# Patient Record
Sex: FEMALE | Race: ASIAN | Employment: UNEMPLOYED | URBAN - METROPOLITAN AREA
[De-identification: names, ages, dates, MRNs, and addresses within clinical notes are randomized per-mention and may not be internally consistent; named-entity substitution may affect disease eponyms.]

---

## 2022-07-09 ENCOUNTER — APPOINTMENT (OUTPATIENT)
Dept: GENERAL RADIOLOGY | Age: 47
End: 2022-07-09
Attending: EMERGENCY MEDICINE
Payer: COMMERCIAL

## 2022-07-09 ENCOUNTER — HOSPITAL ENCOUNTER (EMERGENCY)
Age: 47
Discharge: HOME | End: 2022-07-10
Attending: EMERGENCY MEDICINE
Payer: COMMERCIAL

## 2022-07-09 DIAGNOSIS — U07.1 COVID-19: Primary | ICD-10-CM

## 2022-07-09 DIAGNOSIS — R42 ORTHOSTATIC DIZZINESS: ICD-10-CM

## 2022-07-09 DIAGNOSIS — R42 VERTIGO: ICD-10-CM

## 2022-07-09 LAB
ALBUMIN SERPL-MCNC: 3.3 G/DL (ref 3.5–5)
ALBUMIN/GLOB SERPL: 0.7 {RATIO} (ref 1.1–2.2)
ALP SERPL-CCNC: 97 U/L (ref 45–117)
ALT SERPL-CCNC: 72 U/L (ref 12–78)
ANION GAP SERPL CALC-SCNC: 5 MMOL/L (ref 5–15)
AST SERPL-CCNC: 39 U/L (ref 15–37)
BASOPHILS # BLD: 0 K/UL (ref 0–0.1)
BASOPHILS NFR BLD: 0 % (ref 0–1)
BILIRUB SERPL-MCNC: 0.2 MG/DL (ref 0.2–1)
BUN SERPL-MCNC: 7 MG/DL (ref 6–20)
BUN/CREAT SERPL: 10 (ref 12–20)
CALCIUM SERPL-MCNC: 9 MG/DL (ref 8.5–10.1)
CHLORIDE SERPL-SCNC: 102 MMOL/L (ref 97–108)
CO2 SERPL-SCNC: 28 MMOL/L (ref 21–32)
CREAT SERPL-MCNC: 0.73 MG/DL (ref 0.55–1.02)
DIFFERENTIAL METHOD BLD: NORMAL
EOSINOPHIL # BLD: 0 K/UL (ref 0–0.4)
EOSINOPHIL NFR BLD: 1 % (ref 0–7)
ERYTHROCYTE [DISTWIDTH] IN BLOOD BY AUTOMATED COUNT: 13.2 % (ref 11.5–14.5)
GLOBULIN SER CALC-MCNC: 4.6 G/DL (ref 2–4)
GLUCOSE SERPL-MCNC: 171 MG/DL (ref 65–100)
HCT VFR BLD AUTO: 41.3 % (ref 35–47)
HGB BLD-MCNC: 13.7 G/DL (ref 11.5–16)
IMM GRANULOCYTES # BLD AUTO: 0 K/UL (ref 0–0.04)
IMM GRANULOCYTES NFR BLD AUTO: 0 % (ref 0–0.5)
LYMPHOCYTES # BLD: 1.9 K/UL (ref 0.8–3.5)
LYMPHOCYTES NFR BLD: 27 % (ref 12–49)
MCH RBC QN AUTO: 28.7 PG (ref 26–34)
MCHC RBC AUTO-ENTMCNC: 33.2 G/DL (ref 30–36.5)
MCV RBC AUTO: 86.4 FL (ref 80–99)
MONOCYTES # BLD: 0.9 K/UL (ref 0–1)
MONOCYTES NFR BLD: 13 % (ref 5–13)
NEUTS SEG # BLD: 4.1 K/UL (ref 1.8–8)
NEUTS SEG NFR BLD: 59 % (ref 32–75)
NRBC # BLD: 0 K/UL (ref 0–0.01)
NRBC BLD-RTO: 0 PER 100 WBC
PLATELET # BLD AUTO: 272 K/UL (ref 150–400)
PMV BLD AUTO: 11 FL (ref 8.9–12.9)
POTASSIUM SERPL-SCNC: 3.7 MMOL/L (ref 3.5–5.1)
PROT SERPL-MCNC: 7.9 G/DL (ref 6.4–8.2)
RBC # BLD AUTO: 4.78 M/UL (ref 3.8–5.2)
SODIUM SERPL-SCNC: 135 MMOL/L (ref 136–145)
WBC # BLD AUTO: 6.9 K/UL (ref 3.6–11)

## 2022-07-09 PROCEDURE — 96361 HYDRATE IV INFUSION ADD-ON: CPT

## 2022-07-09 PROCEDURE — 99285 EMERGENCY DEPT VISIT HI MDM: CPT

## 2022-07-09 PROCEDURE — 36415 COLL VENOUS BLD VENIPUNCTURE: CPT

## 2022-07-09 PROCEDURE — 74011250636 HC RX REV CODE- 250/636: Performed by: EMERGENCY MEDICINE

## 2022-07-09 PROCEDURE — 96374 THER/PROPH/DIAG INJ IV PUSH: CPT

## 2022-07-09 PROCEDURE — 96375 TX/PRO/DX INJ NEW DRUG ADDON: CPT

## 2022-07-09 PROCEDURE — 85025 COMPLETE CBC W/AUTO DIFF WBC: CPT

## 2022-07-09 PROCEDURE — 80053 COMPREHEN METABOLIC PANEL: CPT

## 2022-07-09 PROCEDURE — 71045 X-RAY EXAM CHEST 1 VIEW: CPT

## 2022-07-09 RX ORDER — ONDANSETRON 2 MG/ML
4 INJECTION INTRAMUSCULAR; INTRAVENOUS
Status: COMPLETED | OUTPATIENT
Start: 2022-07-09 | End: 2022-07-09

## 2022-07-09 RX ORDER — TENOFOVIR ALAFENAMIDE 25 MG/1
25 TABLET ORAL DAILY
COMMUNITY

## 2022-07-09 RX ORDER — SODIUM CHLORIDE 0.9 % (FLUSH) 0.9 %
5-40 SYRINGE (ML) INJECTION EVERY 8 HOURS
Status: DISCONTINUED | OUTPATIENT
Start: 2022-07-09 | End: 2022-07-10 | Stop reason: HOSPADM

## 2022-07-09 RX ORDER — DEXAMETHASONE SODIUM PHOSPHATE 4 MG/ML
10 INJECTION, SOLUTION INTRA-ARTICULAR; INTRALESIONAL; INTRAMUSCULAR; INTRAVENOUS; SOFT TISSUE
Status: COMPLETED | OUTPATIENT
Start: 2022-07-09 | End: 2022-07-09

## 2022-07-09 RX ORDER — SODIUM CHLORIDE 0.9 % (FLUSH) 0.9 %
5-40 SYRINGE (ML) INJECTION AS NEEDED
Status: DISCONTINUED | OUTPATIENT
Start: 2022-07-09 | End: 2022-07-10 | Stop reason: HOSPADM

## 2022-07-09 RX ORDER — MECLIZINE HCL 12.5 MG 12.5 MG/1
25 TABLET ORAL
Status: COMPLETED | OUTPATIENT
Start: 2022-07-09 | End: 2022-07-09

## 2022-07-09 RX ORDER — KETOROLAC TROMETHAMINE 30 MG/ML
30 INJECTION, SOLUTION INTRAMUSCULAR; INTRAVENOUS
Status: COMPLETED | OUTPATIENT
Start: 2022-07-09 | End: 2022-07-09

## 2022-07-09 RX ADMIN — DEXAMETHASONE SODIUM PHOSPHATE 10 MG: 4 INJECTION, SOLUTION INTRAMUSCULAR; INTRAVENOUS at 22:58

## 2022-07-09 RX ADMIN — KETOROLAC TROMETHAMINE 30 MG: 30 INJECTION, SOLUTION INTRAMUSCULAR at 22:59

## 2022-07-09 RX ADMIN — MECLIZINE 25 MG: 12.5 TABLET ORAL at 22:59

## 2022-07-09 RX ADMIN — SODIUM CHLORIDE 1000 ML: 9 INJECTION, SOLUTION INTRAVENOUS at 22:59

## 2022-07-09 RX ADMIN — ONDANSETRON 4 MG: 2 INJECTION INTRAMUSCULAR; INTRAVENOUS at 22:59

## 2022-07-10 ENCOUNTER — APPOINTMENT (OUTPATIENT)
Dept: CT IMAGING | Age: 47
End: 2022-07-10
Attending: EMERGENCY MEDICINE
Payer: COMMERCIAL

## 2022-07-10 VITALS
HEIGHT: 64 IN | WEIGHT: 180 LBS | DIASTOLIC BLOOD PRESSURE: 82 MMHG | BODY MASS INDEX: 30.73 KG/M2 | RESPIRATION RATE: 26 BRPM | TEMPERATURE: 97.8 F | OXYGEN SATURATION: 95 % | HEART RATE: 85 BPM | SYSTOLIC BLOOD PRESSURE: 131 MMHG

## 2022-07-10 PROCEDURE — 74011250636 HC RX REV CODE- 250/636: Performed by: EMERGENCY MEDICINE

## 2022-07-10 PROCEDURE — 70496 CT ANGIOGRAPHY HEAD: CPT

## 2022-07-10 PROCEDURE — 74011000636 HC RX REV CODE- 636: Performed by: EMERGENCY MEDICINE

## 2022-07-10 PROCEDURE — 70450 CT HEAD/BRAIN W/O DYE: CPT

## 2022-07-10 RX ORDER — NIRMATRELVIR AND RITONAVIR 300-100 MG
KIT ORAL
Qty: 1 BOX | Refills: 0 | Status: SHIPPED | OUTPATIENT
Start: 2022-07-10

## 2022-07-10 RX ORDER — ONDANSETRON 4 MG/1
4 TABLET, ORALLY DISINTEGRATING ORAL
Qty: 10 TABLET | Refills: 0 | Status: SHIPPED | OUTPATIENT
Start: 2022-07-10

## 2022-07-10 RX ORDER — DEXAMETHASONE 2 MG/1
TABLET ORAL
Qty: 24 TABLET | Refills: 0 | Status: SHIPPED | OUTPATIENT
Start: 2022-07-10

## 2022-07-10 RX ORDER — MECLIZINE HYDROCHLORIDE 25 MG/1
25 TABLET ORAL
Qty: 20 TABLET | Refills: 0 | Status: SHIPPED | OUTPATIENT
Start: 2022-07-10

## 2022-07-10 RX ADMIN — SODIUM CHLORIDE 1000 ML: 9 INJECTION, SOLUTION INTRAVENOUS at 00:33

## 2022-07-10 RX ADMIN — IOPAMIDOL 100 ML: 755 INJECTION, SOLUTION INTRAVENOUS at 01:11

## 2022-07-10 NOTE — ED NOTES
Post Fall Documentation      Irene NOEL. 7. witnessed fall occurred on 07/10/2022(Date) at [address] (Time). The answers to the following questions summarize the fall: In the patient's own words,:  · What were you attempting to do when you fell? Setting on side of bed waiting to ambulate w/ nurse   · Do you know why you fell? Dizziness   · Do you have any pain/discomfort or any other complaints? No complaints/pain/injury  · Which part of your body made contact with the floor or other object? Buttocks, right side or body     Nurse:  Cem Davis Was this an assisted fall? yes   Was fall witnessed? Yes     By Mehnaz If witnessed, what part of the body made contact with the floor or other object? Buttocks then laid on right side   Patients mental status after the fall/when found: Alert and oriented   Any apparent injury:  No apparent injury   Immediate interventions for injury/suspected injury? No interventions needed   Patient assisted back to bed? Assist X2   Name of provider notified and time, any comments? Dr. Derek Ahn Name of family member notified and time: family member at bedside at time of fall       Immediate VS and physical assessment documented in flow sheets. Neuro assessment every hour x 4 (for potential head injury or unwitnessed fall) documented in flow sheets.       Alex Monroy

## 2022-07-10 NOTE — ED PROVIDER NOTES
EMERGENCY DEPARTMENT HISTORY AND PHYSICAL EXAM      Date: 7/9/2022  Patient Name: Sharona Osborne    History of Presenting Illness     Chief Complaint   Patient presents with    Positive For Covid-19     pt presents with c/o covid + test at home, per family pt has had cough and nausea with dizziness and fever at home. Pt afebrile in triage, family states pt has been taking tylenola dn ibuprofen at home for symptoms. Pt 100% on room air with pmh of vertigo. History Provided By: Patient's Daughter    HPI: Sharona Osborne, 49 y.o. female presents to the ED with cc of fever, dizziness, nausea vomiting. History obtained at the bedside by daughter. Patient has been ill over the last 3 days with fever and chills. In addition to this patient has had dry cough. There is been nasal congestion. Today patient was having dizziness episodes consistent with her vertigo that she has had in the past along with nausea and vomiting. Dizziness is intermittent in nature room spinning associated with positional changes. Dizziness is moderate in severity with no alleviating factors. Patient's nausea vomiting has been accompanied with dizziness episodes. In addition she is also had nausea, however, she denies any abdominal pain or diarrhea. Per the family patient has had a normal gait. There has been no headache, loss of sensation or loss of strength. She denies any neck pain or stiffness. Patient has been medicated at home with Tylenol and ibuprofen. There are no other complaints, changes, or physical findings at this time. PCP: No primary care provider on file. No current facility-administered medications on file prior to encounter. No current outpatient medications on file prior to encounter. Past History     Past Medical History:  History reviewed. No pertinent past medical history. Past Surgical History:  History reviewed. No pertinent surgical history. Family History:  History reviewed.  No pertinent family history. Social History:  Social History     Tobacco Use    Smoking status: Never Smoker    Smokeless tobacco: Never Used   Substance Use Topics    Alcohol use: No    Drug use: No       Allergies:  No Known Allergies      Review of Systems   Review of Systems   Constitutional: Positive for chills, fatigue and fever. Negative for appetite change. HENT: Positive for congestion. Negative for rhinorrhea, sinus pressure and sore throat. Eyes: Negative. Respiratory: Positive for cough. Negative for choking, chest tightness, shortness of breath and wheezing. Cardiovascular: Negative. Negative for chest pain, palpitations and leg swelling. Gastrointestinal: Positive for nausea and vomiting. Negative for abdominal pain, constipation and diarrhea. Endocrine: Negative. Genitourinary: Negative. Negative for difficulty urinating, dysuria, flank pain and urgency. Musculoskeletal: Negative. Skin: Negative. Neurological: Positive for dizziness. Negative for speech difficulty, weakness, light-headedness, numbness and headaches. Psychiatric/Behavioral: Negative. All other systems reviewed and are negative. Physical Exam   Physical Exam  Vitals and nursing note reviewed. Constitutional:       General: She is not in acute distress. Appearance: She is well-developed. She is obese. She is not diaphoretic. Comments: Actively vomiting at the time of evaluation     HENT:      Head: Normocephalic and atraumatic. Mouth/Throat:      Pharynx: No oropharyngeal exudate. Eyes:      Conjunctiva/sclera: Conjunctivae normal.      Pupils: Pupils are equal, round, and reactive to light. Neck:      Vascular: No JVD. Trachea: No tracheal deviation. Cardiovascular:      Rate and Rhythm: Normal rate and regular rhythm. Heart sounds: Normal heart sounds. No murmur heard. Pulmonary:      Effort: Pulmonary effort is normal. No respiratory distress.       Breath sounds: Normal breath sounds. No stridor. No wheezing or rales. Abdominal:      General: There is no distension. Palpations: Abdomen is soft. Tenderness: There is no abdominal tenderness. There is no guarding or rebound. Musculoskeletal:         General: No tenderness. Normal range of motion. Cervical back: Normal range of motion and neck supple. Skin:     General: Skin is warm and dry. Capillary Refill: Capillary refill takes less than 2 seconds. Neurological:      Mental Status: She is alert and oriented to person, place, and time. Cranial Nerves: No cranial nerve deficit. Comments: No gross motor or sensory deficits, no resting nystagmus, normal gait     Psychiatric:         Behavior: Behavior normal.         Diagnostic Study Results     Labs -     Recent Results (from the past 12 hour(s))   CBC WITH AUTOMATED DIFF    Collection Time: 07/09/22 10:48 PM   Result Value Ref Range    WBC 6.9 3.6 - 11.0 K/uL    RBC 4.78 3.80 - 5.20 M/uL    HGB 13.7 11.5 - 16.0 g/dL    HCT 41.3 35.0 - 47.0 %    MCV 86.4 80.0 - 99.0 FL    MCH 28.7 26.0 - 34.0 PG    MCHC 33.2 30.0 - 36.5 g/dL    RDW 13.2 11.5 - 14.5 %    PLATELET 480 919 - 898 K/uL    MPV 11.0 8.9 - 12.9 FL    NRBC 0.0 0  WBC    ABSOLUTE NRBC 0.00 0.00 - 0.01 K/uL    NEUTROPHILS 59 32 - 75 %    LYMPHOCYTES 27 12 - 49 %    MONOCYTES 13 5 - 13 %    EOSINOPHILS 1 0 - 7 %    BASOPHILS 0 0 - 1 %    IMMATURE GRANULOCYTES 0 0.0 - 0.5 %    ABS. NEUTROPHILS 4.1 1.8 - 8.0 K/UL    ABS. LYMPHOCYTES 1.9 0.8 - 3.5 K/UL    ABS. MONOCYTES 0.9 0.0 - 1.0 K/UL    ABS. EOSINOPHILS 0.0 0.0 - 0.4 K/UL    ABS. BASOPHILS 0.0 0.0 - 0.1 K/UL    ABS. IMM.  GRANS. 0.0 0.00 - 0.04 K/UL    DF AUTOMATED     METABOLIC PANEL, COMPREHENSIVE    Collection Time: 07/09/22 10:48 PM   Result Value Ref Range    Sodium 135 (L) 136 - 145 mmol/L    Potassium 3.7 3.5 - 5.1 mmol/L    Chloride 102 97 - 108 mmol/L    CO2 28 21 - 32 mmol/L    Anion gap 5 5 - 15 mmol/L    Glucose 171 (H) 65 - 100 mg/dL    BUN 7 6 - 20 MG/DL    Creatinine 0.73 0.55 - 1.02 MG/DL    BUN/Creatinine ratio 10 (L) 12 - 20      GFR est AA >60 >60 ml/min/1.73m2    GFR est non-AA >60 >60 ml/min/1.73m2    Calcium 9.0 8.5 - 10.1 MG/DL    Bilirubin, total 0.2 0.2 - 1.0 MG/DL    ALT (SGPT) 72 12 - 78 U/L    AST (SGOT) 39 (H) 15 - 37 U/L    Alk. phosphatase 97 45 - 117 U/L    Protein, total 7.9 6.4 - 8.2 g/dL    Albumin 3.3 (L) 3.5 - 5.0 g/dL    Globulin 4.6 (H) 2.0 - 4.0 g/dL    A-G Ratio 0.7 (L) 1.1 - 2.2         Radiologic Studies -   CT HEAD WO CONT   Final Result   No acute intracranial abnormality. CTA HEAD NECK W CONT   Final Result      1. No acute process. No large vessel occlusion, arterial dissection, or   flow-limiting stenosis. 2. CT perfusion not performed. XR CHEST PORT   Final Result   Hypoinspiratory lungs. No focal airspace disease. CT Results  (Last 48 hours)    None        CXR Results  (Last 48 hours)    None          Medical Decision Making   I am the first provider for this patient. I reviewed the vital signs, available nursing notes, past medical history, past surgical history, family history and social history. Vital Signs-Reviewed the patient's vital signs. Patient Vitals for the past 12 hrs:   Temp Pulse Resp BP SpO2   07/10/22 0248 -- 85 26 131/82 95 %   07/10/22 0233 -- 87 28 129/79 95 %   07/10/22 0218 -- 88 26 123/78 95 %   07/10/22 0203 -- 88 26 127/78 96 %   07/10/22 0148 -- 88 25 131/83 96 %   07/10/22 0048 -- 83 23 124/84 95 %   07/10/22 0033 97.8 °F (36.6 °C) 80 26 137/84 98 %   07/09/22 2342 -- -- -- 115/76 94 %   07/09/22 2327 -- -- -- 127/77 97 %   07/09/22 2257 -- -- -- 128/83 94 %   07/09/22 2227 98.1 °F (36.7 °C) (!) 102 20 130/83 99 %       Records Reviewed: Nursing Notes    Provider Notes (Medical Decision Making):   DDx- COVID, Vertigo, pneumonia, dehydration    ED Course:   Initial assessment performed.  The patients presenting problems have been discussed, and they are in agreement with the care plan formulated and outlined with them. I have encouraged them to ask questions as they arise throughout their visit. Will provide IV fluids, Zofran, Toradol, Decadron, Meclizine for symptoms control and reassess. After 1st liter of fluids was going to have nursing get the pt up and ambulate. As they were getting ready to help her up. Pt had attempted to get up on her own became lightheaded and fell onto floor. Pt assisted up with no injuries or complaints. Additional liter of IV fluids ordered in addition CT head and CTA of head and neck. CT's unremarkable, no sig vascular disease. Pt feeling better after IV fluids. Pt able to ambulate unassisted without any lightheadedness. Likely syncope and dizziness related to orthostatic hypotension due to poor PO intake with COVID. Will dc home. Pt daughter bedside throughout and agrees with tx plan. Disposition:  DC home     DISCHARGE PLAN:  1. Discharge Medication List as of 7/10/2022  3:21 AM      START taking these medications    Details   nirmatrelvir-ritonavir (Paxlovid, EUA,) 150 mg x 2- 100 mg tablet Take as directed, Print, Disp-1 Box, R-0      ondansetron (ZOFRAN ODT) 4 mg disintegrating tablet Take 1 Tablet by mouth every eight (8) hours as needed for Nausea or Vomiting., Normal, Disp-10 Tablet, R-0      meclizine (ANTIVERT) 25 mg tablet Take 1 Tablet by mouth three (3) times daily as needed for Dizziness., Normal, Disp-20 Tablet, R-0      dexAMETHasone (DECADRON) 2 mg tablet Take 10mg on day 1, then 8mg on day 2/3, then 6mg on day 4/5, then 4mg on day 6/7, then 2mg x 1, Normal, Disp-24 Tablet, R-0         CONTINUE these medications which have NOT CHANGED    Details   tenofovir ALAFENAMIDE FUMARATE (Vemlidy) 25 mg tablet Take 25 mg by mouth daily. , Historical Med           2. Follow-up Information    None       3. Return to ED if worse     Diagnosis     Clinical Impression:   1. COVID-19    2. Vertigo    3. Orthostatic dizziness        Attestations:    Julieth Bernal, DO        Please note that this dictation was completed with [employer], the computer voice recognition software. Quite often unanticipated grammatical, syntax, homophones, and other interpretive errors are inadvertently transcribed by the computer software. Please disregard these errors. Please excuse any errors that have escaped final proofreading. Thank you.

## 2022-07-10 NOTE — ED NOTES
0030: This RN went into pt's room to try and ambulate her per MD request. This RN sat pt up slowly and pt was sitting steadily on side of bed with shoes on and feet flat on the floor. This RN began to put gloves on and pt slid off of bed and onto floor while family at bedside was holding onto pt. This RN got assistance from other staff to get pt back up and into bed and placed back on monitor. MD [facility] at bedside. Pt stated getting \"dizzy\" while sitting on the bed. Vital signs taken and documented and pt denies any injury. At this time, pt's family member told this RN that pt fell earlier today and hit her head. The pt's family member had not disclosed this information to any staff members prior to. MD [facility] notified and orders placed. 0300: Per MD [facility], attempt to ambulate pt again. X2 ED Techs and RN ambulated pt around room successfully and pt did no become increasingly dizzy. MD [facility] notified.

## 2022-07-10 NOTE — ED NOTES
Patient discharged by Rosalva Granado MD - pt sent to the front lobby in wheelchair then to private vehicle, no acute distress noted at time of discharge - Discharge information / home RX / and reasons to return to the ED were reviewed by the ED provider.

## 2022-07-10 NOTE — ED TRIAGE NOTES
.  Chief Complaint   Patient presents with    Positive For Covid-19     pt presents with c/o covid + test at home, per family pt has had cough and nausea with dizziness and fever at home. Pt afebrile in triage, family states pt has been taking tylenola dn ibuprofen at home for symptoms. Pt 100% on room air with pmh of vertigo.

## 2022-07-10 NOTE — DISCHARGE INSTRUCTIONS
Schedule meclizine every 8 hours for the next two days    Push fluid intake,     Alternate tylenol and ibuprofen every 3 hours for 3 days

## 2022-07-17 ENCOUNTER — HOSPITAL ENCOUNTER (INPATIENT)
Age: 47
LOS: 5 days | Discharge: HOME | DRG: 329 | End: 2022-07-22
Attending: STUDENT IN AN ORGANIZED HEALTH CARE EDUCATION/TRAINING PROGRAM | Admitting: SURGERY
Payer: COMMERCIAL

## 2022-07-17 ENCOUNTER — APPOINTMENT (OUTPATIENT)
Dept: GENERAL RADIOLOGY | Age: 47
DRG: 329 | End: 2022-07-17
Attending: STUDENT IN AN ORGANIZED HEALTH CARE EDUCATION/TRAINING PROGRAM
Payer: COMMERCIAL

## 2022-07-17 ENCOUNTER — APPOINTMENT (OUTPATIENT)
Dept: CT IMAGING | Age: 47
DRG: 329 | End: 2022-07-17
Attending: STUDENT IN AN ORGANIZED HEALTH CARE EDUCATION/TRAINING PROGRAM
Payer: COMMERCIAL

## 2022-07-17 ENCOUNTER — ANESTHESIA EVENT (OUTPATIENT)
Dept: SURGERY | Age: 47
DRG: 329 | End: 2022-07-17
Payer: COMMERCIAL

## 2022-07-17 ENCOUNTER — ANESTHESIA (OUTPATIENT)
Dept: SURGERY | Age: 47
DRG: 329 | End: 2022-07-17
Payer: COMMERCIAL

## 2022-07-17 DIAGNOSIS — K66.8 PNEUMOPERITONEUM: ICD-10-CM

## 2022-07-17 DIAGNOSIS — R19.8 PERFORATED VISCUS: Primary | ICD-10-CM

## 2022-07-17 LAB
ALBUMIN SERPL-MCNC: 2.7 G/DL (ref 3.5–5)
ALBUMIN/GLOB SERPL: 0.7 {RATIO} (ref 1.1–2.2)
ALP SERPL-CCNC: 73 U/L (ref 45–117)
ALT SERPL-CCNC: 54 U/L (ref 12–78)
ANION GAP SERPL CALC-SCNC: 8 MMOL/L (ref 5–15)
APPEARANCE UR: CLEAR
AST SERPL-CCNC: 19 U/L (ref 15–37)
BACTERIA URNS QL MICRO: NEGATIVE /HPF
BASOPHILS # BLD: 0 K/UL (ref 0–0.1)
BASOPHILS NFR BLD: 0 % (ref 0–1)
BILIRUB SERPL-MCNC: 0.5 MG/DL (ref 0.2–1)
BILIRUB UR QL: NEGATIVE
BNP SERPL-MCNC: 38 PG/ML
BUN SERPL-MCNC: 15 MG/DL (ref 6–20)
BUN/CREAT SERPL: 19 (ref 12–20)
CALCIUM SERPL-MCNC: 7.9 MG/DL (ref 8.5–10.1)
CHLORIDE SERPL-SCNC: 95 MMOL/L (ref 97–108)
CO2 SERPL-SCNC: 26 MMOL/L (ref 21–32)
COLOR UR: ABNORMAL
CREAT SERPL-MCNC: 0.78 MG/DL (ref 0.55–1.02)
D DIMER PPP FEU-MCNC: 0.54 MG/L FEU (ref 0–0.65)
DIFFERENTIAL METHOD BLD: ABNORMAL
EOSINOPHIL # BLD: 0 K/UL (ref 0–0.4)
EOSINOPHIL NFR BLD: 0 % (ref 0–7)
EPITH CASTS URNS QL MICRO: ABNORMAL /LPF
ERYTHROCYTE [DISTWIDTH] IN BLOOD BY AUTOMATED COUNT: 12.7 % (ref 11.5–14.5)
GLOBULIN SER CALC-MCNC: 4 G/DL (ref 2–4)
GLUCOSE SERPL-MCNC: 294 MG/DL (ref 65–100)
GLUCOSE UR STRIP.AUTO-MCNC: 250 MG/DL
HCG UR QL: NEGATIVE
HCT VFR BLD AUTO: 40.1 % (ref 35–47)
HGB BLD-MCNC: 13.7 G/DL (ref 11.5–16)
HGB UR QL STRIP: NEGATIVE
HYALINE CASTS URNS QL MICRO: ABNORMAL /LPF (ref 0–2)
IMM GRANULOCYTES # BLD AUTO: 0 K/UL (ref 0–0.04)
IMM GRANULOCYTES NFR BLD AUTO: 0 % (ref 0–0.5)
KETONES UR QL STRIP.AUTO: NEGATIVE MG/DL
LEUKOCYTE ESTERASE UR QL STRIP.AUTO: NEGATIVE
LYMPHOCYTES # BLD: 1.4 K/UL (ref 0.8–3.5)
LYMPHOCYTES NFR BLD: 5 % (ref 12–49)
MCH RBC QN AUTO: 29 PG (ref 26–34)
MCHC RBC AUTO-ENTMCNC: 34.2 G/DL (ref 30–36.5)
MCV RBC AUTO: 85 FL (ref 80–99)
MONOCYTES # BLD: 2.5 K/UL (ref 0–1)
MONOCYTES NFR BLD: 9 % (ref 5–13)
NEUTS SEG # BLD: 23.5 K/UL (ref 1.8–8)
NEUTS SEG NFR BLD: 86 % (ref 32–75)
NITRITE UR QL STRIP.AUTO: NEGATIVE
NRBC # BLD: 0 K/UL (ref 0–0.01)
NRBC BLD-RTO: 0 PER 100 WBC
PH UR STRIP: 5.5 [PH] (ref 5–8)
PLATELET # BLD AUTO: 458 K/UL (ref 150–400)
PMV BLD AUTO: 9.9 FL (ref 8.9–12.9)
POTASSIUM SERPL-SCNC: 4.1 MMOL/L (ref 3.5–5.1)
PROT SERPL-MCNC: 6.7 G/DL (ref 6.4–8.2)
PROT UR STRIP-MCNC: NEGATIVE MG/DL
RBC # BLD AUTO: 4.72 M/UL (ref 3.8–5.2)
RBC #/AREA URNS HPF: ABNORMAL /HPF (ref 0–5)
RBC MORPH BLD: ABNORMAL
SODIUM SERPL-SCNC: 129 MMOL/L (ref 136–145)
SP GR UR REFRACTOMETRY: <1.005 (ref 1–1.03)
UA: UC IF INDICATED,UAUC: ABNORMAL
UROBILINOGEN UR QL STRIP.AUTO: 1 EU/DL (ref 0.2–1)
WBC # BLD AUTO: 27.4 K/UL (ref 3.6–11)
WBC URNS QL MICRO: ABNORMAL /HPF (ref 0–4)

## 2022-07-17 PROCEDURE — 2709999900 HC NON-CHARGEABLE SUPPLY: Performed by: SURGERY

## 2022-07-17 PROCEDURE — 81001 URINALYSIS AUTO W/SCOPE: CPT

## 2022-07-17 PROCEDURE — 81025 URINE PREGNANCY TEST: CPT

## 2022-07-17 PROCEDURE — 77030008606 HC TRCR ENDOSC KII AMR -B: Performed by: SURGERY

## 2022-07-17 PROCEDURE — 76060000034 HC ANESTHESIA 1.5 TO 2 HR: Performed by: SURGERY

## 2022-07-17 PROCEDURE — 74011000250 HC RX REV CODE- 250: Performed by: STUDENT IN AN ORGANIZED HEALTH CARE EDUCATION/TRAINING PROGRAM

## 2022-07-17 PROCEDURE — 74011000258 HC RX REV CODE- 258: Performed by: STUDENT IN AN ORGANIZED HEALTH CARE EDUCATION/TRAINING PROGRAM

## 2022-07-17 PROCEDURE — 80053 COMPREHEN METABOLIC PANEL: CPT

## 2022-07-17 PROCEDURE — C9113 INJ PANTOPRAZOLE SODIUM, VIA: HCPCS | Performed by: SURGERY

## 2022-07-17 PROCEDURE — 74011250636 HC RX REV CODE- 250/636: Performed by: ANESTHESIOLOGY

## 2022-07-17 PROCEDURE — 74177 CT ABD & PELVIS W/CONTRAST: CPT

## 2022-07-17 PROCEDURE — 77030002916 HC SUT ETHLN J&J -A: Performed by: SURGERY

## 2022-07-17 PROCEDURE — 77030008684 HC TU ET CUF COVD -B: Performed by: ANESTHESIOLOGY

## 2022-07-17 PROCEDURE — 74011000250 HC RX REV CODE- 250: Performed by: SURGERY

## 2022-07-17 PROCEDURE — 77030005513 HC CATH URETH FOL11 MDII -B: Performed by: SURGERY

## 2022-07-17 PROCEDURE — 74011000258 HC RX REV CODE- 258: Performed by: SURGERY

## 2022-07-17 PROCEDURE — 71045 X-RAY EXAM CHEST 1 VIEW: CPT

## 2022-07-17 PROCEDURE — 77030002933 HC SUT MCRYL J&J -A: Performed by: SURGERY

## 2022-07-17 PROCEDURE — 77030020747 HC TU INSUF ENDOSC TELE -A: Performed by: SURGERY

## 2022-07-17 PROCEDURE — 96375 TX/PRO/DX INJ NEW DRUG ADDON: CPT

## 2022-07-17 PROCEDURE — 87040 BLOOD CULTURE FOR BACTERIA: CPT

## 2022-07-17 PROCEDURE — 74011000636 HC RX REV CODE- 636: Performed by: STUDENT IN AN ORGANIZED HEALTH CARE EDUCATION/TRAINING PROGRAM

## 2022-07-17 PROCEDURE — 74011000258 HC RX REV CODE- 258: Performed by: NURSE ANESTHETIST, CERTIFIED REGISTERED

## 2022-07-17 PROCEDURE — 36415 COLL VENOUS BLD VENIPUNCTURE: CPT

## 2022-07-17 PROCEDURE — 77030013567 HC DRN WND RESERV BARD -A: Performed by: SURGERY

## 2022-07-17 PROCEDURE — 77030012407 HC DRN WND BARD -B: Performed by: SURGERY

## 2022-07-17 PROCEDURE — 87205 SMEAR GRAM STAIN: CPT

## 2022-07-17 PROCEDURE — 87075 CULTR BACTERIA EXCEPT BLOOD: CPT

## 2022-07-17 PROCEDURE — 96374 THER/PROPH/DIAG INJ IV PUSH: CPT

## 2022-07-17 PROCEDURE — 99285 EMERGENCY DEPT VISIT HI MDM: CPT

## 2022-07-17 PROCEDURE — 74011250636 HC RX REV CODE- 250/636: Performed by: NURSE ANESTHETIST, CERTIFIED REGISTERED

## 2022-07-17 PROCEDURE — 77030013079 HC BLNKT BAIR HGGR 3M -A: Performed by: ANESTHESIOLOGY

## 2022-07-17 PROCEDURE — 77030021678 HC GLIDESCP STAT DISP VERT -B: Performed by: ANESTHESIOLOGY

## 2022-07-17 PROCEDURE — 74011250636 HC RX REV CODE- 250/636: Performed by: SURGERY

## 2022-07-17 PROCEDURE — 76210000006 HC OR PH I REC 0.5 TO 1 HR: Performed by: SURGERY

## 2022-07-17 PROCEDURE — 77030033639 HC SHR ENDO COAG HARM 36 J&J -E: Performed by: SURGERY

## 2022-07-17 PROCEDURE — 77030019908 HC STETH ESOPH SIMS -A: Performed by: ANESTHESIOLOGY

## 2022-07-17 PROCEDURE — 77030016151 HC PROTCTR LNS DFOG COVD -B: Performed by: SURGERY

## 2022-07-17 PROCEDURE — 77030026438 HC STYL ET INTUB CARD -A: Performed by: ANESTHESIOLOGY

## 2022-07-17 PROCEDURE — 74011250636 HC RX REV CODE- 250/636: Performed by: STUDENT IN AN ORGANIZED HEALTH CARE EDUCATION/TRAINING PROGRAM

## 2022-07-17 PROCEDURE — 77030008771 HC TU NG SALEM SUMP -A: Performed by: ANESTHESIOLOGY

## 2022-07-17 PROCEDURE — 65270000029 HC RM PRIVATE

## 2022-07-17 PROCEDURE — 74011000250 HC RX REV CODE- 250: Performed by: NURSE ANESTHETIST, CERTIFIED REGISTERED

## 2022-07-17 PROCEDURE — 77030002996 HC SUT SLK J&J -A: Performed by: SURGERY

## 2022-07-17 PROCEDURE — 77030008595 HC TRCR ENDOSC AMR -A: Performed by: SURGERY

## 2022-07-17 PROCEDURE — 76010000153 HC OR TIME 1.5 TO 2 HR: Performed by: SURGERY

## 2022-07-17 PROCEDURE — 93005 ELECTROCARDIOGRAM TRACING: CPT

## 2022-07-17 PROCEDURE — 77030008596 HC TRCR ENDOSC AMR -B: Performed by: SURGERY

## 2022-07-17 PROCEDURE — 99221 1ST HOSP IP/OBS SF/LOW 40: CPT | Performed by: SURGERY

## 2022-07-17 PROCEDURE — 83880 ASSAY OF NATRIURETIC PEPTIDE: CPT

## 2022-07-17 PROCEDURE — 77030008756 HC TU IRR SUC STRY -B: Performed by: SURGERY

## 2022-07-17 PROCEDURE — 85379 FIBRIN DEGRADATION QUANT: CPT

## 2022-07-17 PROCEDURE — 85025 COMPLETE CBC W/AUTO DIFF WBC: CPT

## 2022-07-17 RX ORDER — CEFAZOLIN SODIUM/WATER 2 G/20 ML
SYRINGE (ML) INTRAVENOUS AS NEEDED
Status: DISCONTINUED | OUTPATIENT
Start: 2022-07-17 | End: 2022-07-17 | Stop reason: HOSPADM

## 2022-07-17 RX ORDER — HYDROMORPHONE HYDROCHLORIDE 1 MG/ML
0.5 INJECTION, SOLUTION INTRAMUSCULAR; INTRAVENOUS; SUBCUTANEOUS
Status: DISCONTINUED | OUTPATIENT
Start: 2022-07-17 | End: 2022-07-22 | Stop reason: HOSPADM

## 2022-07-17 RX ORDER — HYDROMORPHONE HYDROCHLORIDE 1 MG/ML
INJECTION, SOLUTION INTRAMUSCULAR; INTRAVENOUS; SUBCUTANEOUS
Status: DISCONTINUED
Start: 2022-07-17 | End: 2022-07-17 | Stop reason: WASHOUT

## 2022-07-17 RX ORDER — MIDAZOLAM HYDROCHLORIDE 1 MG/ML
INJECTION, SOLUTION INTRAMUSCULAR; INTRAVENOUS AS NEEDED
Status: DISCONTINUED | OUTPATIENT
Start: 2022-07-17 | End: 2022-07-17 | Stop reason: HOSPADM

## 2022-07-17 RX ORDER — ONDANSETRON 2 MG/ML
INJECTION INTRAMUSCULAR; INTRAVENOUS
Status: DISCONTINUED
Start: 2022-07-17 | End: 2022-07-17 | Stop reason: WASHOUT

## 2022-07-17 RX ORDER — SODIUM CHLORIDE, SODIUM LACTATE, POTASSIUM CHLORIDE, CALCIUM CHLORIDE 600; 310; 30; 20 MG/100ML; MG/100ML; MG/100ML; MG/100ML
INJECTION, SOLUTION INTRAVENOUS
Status: DISCONTINUED | OUTPATIENT
Start: 2022-07-17 | End: 2022-07-17 | Stop reason: HOSPADM

## 2022-07-17 RX ORDER — HYDROMORPHONE HYDROCHLORIDE 1 MG/ML
0.2 INJECTION, SOLUTION INTRAMUSCULAR; INTRAVENOUS; SUBCUTANEOUS
Status: DISCONTINUED | OUTPATIENT
Start: 2022-07-17 | End: 2022-07-17 | Stop reason: HOSPADM

## 2022-07-17 RX ORDER — DIPHENHYDRAMINE HYDROCHLORIDE 50 MG/ML
25 INJECTION, SOLUTION INTRAMUSCULAR; INTRAVENOUS
Status: DISCONTINUED | OUTPATIENT
Start: 2022-07-17 | End: 2022-07-22 | Stop reason: HOSPADM

## 2022-07-17 RX ORDER — MECLIZINE HCL 12.5 MG 12.5 MG/1
25 TABLET ORAL
Status: DISCONTINUED | OUTPATIENT
Start: 2022-07-17 | End: 2022-07-22 | Stop reason: HOSPADM

## 2022-07-17 RX ORDER — ONDANSETRON 2 MG/ML
4 INJECTION INTRAMUSCULAR; INTRAVENOUS
Status: DISCONTINUED | OUTPATIENT
Start: 2022-07-17 | End: 2022-07-22 | Stop reason: HOSPADM

## 2022-07-17 RX ORDER — KETOROLAC TROMETHAMINE 30 MG/ML
15 INJECTION, SOLUTION INTRAMUSCULAR; INTRAVENOUS
Status: COMPLETED | OUTPATIENT
Start: 2022-07-17 | End: 2022-07-17

## 2022-07-17 RX ORDER — SODIUM CHLORIDE 0.9 % (FLUSH) 0.9 %
5-40 SYRINGE (ML) INJECTION AS NEEDED
Status: CANCELLED | OUTPATIENT
Start: 2022-07-17

## 2022-07-17 RX ORDER — ROCURONIUM BROMIDE 10 MG/ML
INJECTION, SOLUTION INTRAVENOUS AS NEEDED
Status: DISCONTINUED | OUTPATIENT
Start: 2022-07-17 | End: 2022-07-17 | Stop reason: HOSPADM

## 2022-07-17 RX ORDER — SODIUM CHLORIDE 0.9 % (FLUSH) 0.9 %
5-40 SYRINGE (ML) INJECTION EVERY 8 HOURS
Status: DISCONTINUED | OUTPATIENT
Start: 2022-07-17 | End: 2022-07-17 | Stop reason: HOSPADM

## 2022-07-17 RX ORDER — SODIUM CHLORIDE 0.9 % (FLUSH) 0.9 %
5-40 SYRINGE (ML) INJECTION EVERY 8 HOURS
Status: CANCELLED | OUTPATIENT
Start: 2022-07-17

## 2022-07-17 RX ORDER — FENTANYL CITRATE 50 UG/ML
INJECTION, SOLUTION INTRAMUSCULAR; INTRAVENOUS AS NEEDED
Status: DISCONTINUED | OUTPATIENT
Start: 2022-07-17 | End: 2022-07-17 | Stop reason: HOSPADM

## 2022-07-17 RX ORDER — ONDANSETRON 2 MG/ML
INJECTION INTRAMUSCULAR; INTRAVENOUS AS NEEDED
Status: DISCONTINUED | OUTPATIENT
Start: 2022-07-17 | End: 2022-07-17 | Stop reason: HOSPADM

## 2022-07-17 RX ORDER — SODIUM CHLORIDE 0.9 % (FLUSH) 0.9 %
5-40 SYRINGE (ML) INJECTION EVERY 8 HOURS
Status: DISCONTINUED | OUTPATIENT
Start: 2022-07-17 | End: 2022-07-22 | Stop reason: HOSPADM

## 2022-07-17 RX ORDER — FENTANYL CITRATE 50 UG/ML
25 INJECTION, SOLUTION INTRAMUSCULAR; INTRAVENOUS
Status: DISCONTINUED | OUTPATIENT
Start: 2022-07-17 | End: 2022-07-17 | Stop reason: HOSPADM

## 2022-07-17 RX ORDER — FENTANYL CITRATE 50 UG/ML
50 INJECTION, SOLUTION INTRAMUSCULAR; INTRAVENOUS
Status: COMPLETED | OUTPATIENT
Start: 2022-07-17 | End: 2022-07-17

## 2022-07-17 RX ORDER — SODIUM CHLORIDE 0.9 % (FLUSH) 0.9 %
5-40 SYRINGE (ML) INJECTION AS NEEDED
Status: DISCONTINUED | OUTPATIENT
Start: 2022-07-17 | End: 2022-07-22 | Stop reason: HOSPADM

## 2022-07-17 RX ORDER — DEXTROSE, SODIUM CHLORIDE, AND POTASSIUM CHLORIDE 5; .45; .15 G/100ML; G/100ML; G/100ML
125 INJECTION INTRAVENOUS CONTINUOUS
Status: DISCONTINUED | OUTPATIENT
Start: 2022-07-17 | End: 2022-07-18

## 2022-07-17 RX ORDER — SUCCINYLCHOLINE CHLORIDE 20 MG/ML
INJECTION INTRAMUSCULAR; INTRAVENOUS AS NEEDED
Status: DISCONTINUED | OUTPATIENT
Start: 2022-07-17 | End: 2022-07-17 | Stop reason: HOSPADM

## 2022-07-17 RX ORDER — SODIUM CHLORIDE 0.9 % (FLUSH) 0.9 %
5-40 SYRINGE (ML) INJECTION AS NEEDED
Status: DISCONTINUED | OUTPATIENT
Start: 2022-07-17 | End: 2022-07-17 | Stop reason: HOSPADM

## 2022-07-17 RX ORDER — DEXAMETHASONE SODIUM PHOSPHATE 4 MG/ML
INJECTION, SOLUTION INTRA-ARTICULAR; INTRALESIONAL; INTRAMUSCULAR; INTRAVENOUS; SOFT TISSUE AS NEEDED
Status: DISCONTINUED | OUTPATIENT
Start: 2022-07-17 | End: 2022-07-17 | Stop reason: HOSPADM

## 2022-07-17 RX ORDER — BUPIVACAINE HYDROCHLORIDE AND EPINEPHRINE 5; 5 MG/ML; UG/ML
INJECTION, SOLUTION EPIDURAL; INTRACAUDAL; PERINEURAL AS NEEDED
Status: DISCONTINUED | OUTPATIENT
Start: 2022-07-17 | End: 2022-07-17 | Stop reason: HOSPADM

## 2022-07-17 RX ORDER — ONDANSETRON 2 MG/ML
4 INJECTION INTRAMUSCULAR; INTRAVENOUS AS NEEDED
Status: DISCONTINUED | OUTPATIENT
Start: 2022-07-17 | End: 2022-07-17 | Stop reason: HOSPADM

## 2022-07-17 RX ORDER — PROPOFOL 10 MG/ML
INJECTION, EMULSION INTRAVENOUS AS NEEDED
Status: DISCONTINUED | OUTPATIENT
Start: 2022-07-17 | End: 2022-07-17 | Stop reason: HOSPADM

## 2022-07-17 RX ORDER — HYDROMORPHONE HYDROCHLORIDE 2 MG/ML
INJECTION, SOLUTION INTRAMUSCULAR; INTRAVENOUS; SUBCUTANEOUS AS NEEDED
Status: DISCONTINUED | OUTPATIENT
Start: 2022-07-17 | End: 2022-07-17 | Stop reason: HOSPADM

## 2022-07-17 RX ORDER — LIDOCAINE HYDROCHLORIDE 20 MG/ML
INJECTION, SOLUTION EPIDURAL; INFILTRATION; INTRACAUDAL; PERINEURAL AS NEEDED
Status: DISCONTINUED | OUTPATIENT
Start: 2022-07-17 | End: 2022-07-17 | Stop reason: HOSPADM

## 2022-07-17 RX ORDER — NALOXONE HYDROCHLORIDE 0.4 MG/ML
0.4 INJECTION, SOLUTION INTRAMUSCULAR; INTRAVENOUS; SUBCUTANEOUS AS NEEDED
Status: DISCONTINUED | OUTPATIENT
Start: 2022-07-17 | End: 2022-07-22 | Stop reason: HOSPADM

## 2022-07-17 RX ORDER — ACETAMINOPHEN 325 MG/1
650 TABLET ORAL
Status: DISCONTINUED | OUTPATIENT
Start: 2022-07-17 | End: 2022-07-22 | Stop reason: HOSPADM

## 2022-07-17 RX ADMIN — HYDROMORPHONE HYDROCHLORIDE 0.2 MG: 2 INJECTION, SOLUTION INTRAMUSCULAR; INTRAVENOUS; SUBCUTANEOUS at 16:57

## 2022-07-17 RX ADMIN — FENTANYL CITRATE 50 MCG: 50 INJECTION, SOLUTION INTRAMUSCULAR; INTRAVENOUS at 13:09

## 2022-07-17 RX ADMIN — ONDANSETRON HYDROCHLORIDE 4 MG: 2 INJECTION, SOLUTION INTRAMUSCULAR; INTRAVENOUS at 16:16

## 2022-07-17 RX ADMIN — FENTANYL CITRATE 50 MCG: 50 INJECTION, SOLUTION INTRAMUSCULAR; INTRAVENOUS at 16:03

## 2022-07-17 RX ADMIN — FENTANYL CITRATE 25 MCG: 50 INJECTION, SOLUTION INTRAMUSCULAR; INTRAVENOUS at 18:05

## 2022-07-17 RX ADMIN — DEXAMETHASONE SODIUM PHOSPHATE 8 MG: 4 INJECTION, SOLUTION INTRAMUSCULAR; INTRAVENOUS at 16:16

## 2022-07-17 RX ADMIN — HYDROMORPHONE HYDROCHLORIDE 0.2 MG: 2 INJECTION, SOLUTION INTRAMUSCULAR; INTRAVENOUS; SUBCUTANEOUS at 16:37

## 2022-07-17 RX ADMIN — FENTANYL CITRATE 50 MCG: 50 INJECTION, SOLUTION INTRAMUSCULAR; INTRAVENOUS at 16:28

## 2022-07-17 RX ADMIN — PIPERACILLIN AND TAZOBACTAM 3.38 G: 3; .375 INJECTION, POWDER, FOR SOLUTION INTRAVENOUS at 13:10

## 2022-07-17 RX ADMIN — HYDROMORPHONE HYDROCHLORIDE 0.5 MG: 1 INJECTION, SOLUTION INTRAMUSCULAR; INTRAVENOUS; SUBCUTANEOUS at 22:08

## 2022-07-17 RX ADMIN — DEXMEDETOMIDINE HYDROCHLORIDE 10 MCG: 100 INJECTION, SOLUTION, CONCENTRATE INTRAVENOUS at 16:13

## 2022-07-17 RX ADMIN — ROCURONIUM BROMIDE 40 MG: 10 INJECTION INTRAVENOUS at 16:28

## 2022-07-17 RX ADMIN — FENTANYL CITRATE 25 MCG: 50 INJECTION, SOLUTION INTRAMUSCULAR; INTRAVENOUS at 17:55

## 2022-07-17 RX ADMIN — HYDROMORPHONE HYDROCHLORIDE 0.2 MG: 2 INJECTION, SOLUTION INTRAMUSCULAR; INTRAVENOUS; SUBCUTANEOUS at 16:47

## 2022-07-17 RX ADMIN — ROCURONIUM BROMIDE 10 MG: 10 INJECTION INTRAVENOUS at 16:59

## 2022-07-17 RX ADMIN — HYDROMORPHONE HYDROCHLORIDE 0.2 MG: 2 INJECTION, SOLUTION INTRAMUSCULAR; INTRAVENOUS; SUBCUTANEOUS at 16:34

## 2022-07-17 RX ADMIN — SODIUM CHLORIDE, POTASSIUM CHLORIDE, SODIUM LACTATE AND CALCIUM CHLORIDE: 600; 310; 30; 20 INJECTION, SOLUTION INTRAVENOUS at 16:06

## 2022-07-17 RX ADMIN — MIDAZOLAM HYDROCHLORIDE 2 MG: 1 INJECTION, SOLUTION INTRAMUSCULAR; INTRAVENOUS at 15:58

## 2022-07-17 RX ADMIN — ROCURONIUM BROMIDE 5 MG: 10 INJECTION INTRAVENOUS at 16:03

## 2022-07-17 RX ADMIN — FAMOTIDINE 20 MG: 10 INJECTION, SOLUTION INTRAVENOUS at 11:46

## 2022-07-17 RX ADMIN — PROPOFOL 150 MG: 10 INJECTION, EMULSION INTRAVENOUS at 16:03

## 2022-07-17 RX ADMIN — SODIUM CHLORIDE, PRESERVATIVE FREE 40 MG: 5 INJECTION INTRAVENOUS at 21:52

## 2022-07-17 RX ADMIN — Medication 2 G: at 16:13

## 2022-07-17 RX ADMIN — IOPAMIDOL 100 ML: 755 INJECTION, SOLUTION INTRAVENOUS at 12:20

## 2022-07-17 RX ADMIN — KETOROLAC TROMETHAMINE 15 MG: 30 INJECTION, SOLUTION INTRAMUSCULAR at 11:46

## 2022-07-17 RX ADMIN — DEXMEDETOMIDINE HYDROCHLORIDE 10 MCG: 100 INJECTION, SOLUTION, CONCENTRATE INTRAVENOUS at 16:24

## 2022-07-17 RX ADMIN — POTASSIUM CHLORIDE, DEXTROSE MONOHYDRATE AND SODIUM CHLORIDE 125 ML/HR: 150; 5; 450 INJECTION, SOLUTION INTRAVENOUS at 21:48

## 2022-07-17 RX ADMIN — SODIUM CHLORIDE, PRESERVATIVE FREE 10 ML: 5 INJECTION INTRAVENOUS at 21:52

## 2022-07-17 RX ADMIN — PIPERACILLIN AND TAZOBACTAM 3.38 G: 3; .375 INJECTION, POWDER, LYOPHILIZED, FOR SOLUTION INTRAVENOUS at 21:52

## 2022-07-17 RX ADMIN — LIDOCAINE HYDROCHLORIDE 100 MG: 20 INJECTION, SOLUTION EPIDURAL; INFILTRATION; INTRACAUDAL; PERINEURAL at 16:03

## 2022-07-17 RX ADMIN — SUGAMMADEX 200 MG: 100 INJECTION, SOLUTION INTRAVENOUS at 17:36

## 2022-07-17 RX ADMIN — SUCCINYLCHOLINE CHLORIDE 100 MG: 20 INJECTION, SOLUTION INTRAMUSCULAR; INTRAVENOUS at 16:03

## 2022-07-17 RX ADMIN — SODIUM CHLORIDE 1000 ML: 9 INJECTION, SOLUTION INTRAVENOUS at 13:10

## 2022-07-17 RX ADMIN — FENTANYL CITRATE 25 MCG: 50 INJECTION, SOLUTION INTRAMUSCULAR; INTRAVENOUS at 18:00

## 2022-07-17 RX ADMIN — HYDROMORPHONE HYDROCHLORIDE 0.2 MG: 2 INJECTION, SOLUTION INTRAMUSCULAR; INTRAVENOUS; SUBCUTANEOUS at 16:53

## 2022-07-17 NOTE — ANESTHESIA POSTPROCEDURE EVALUATION
Procedure(s):  LAPAROSCOPIC REPAIR OF DUODENAL ULCER.     general    Anesthesia Post Evaluation      Multimodal analgesia: multimodal analgesia used between 6 hours prior to anesthesia start to PACU discharge  Patient location during evaluation: bedside  Patient participation: complete - patient participated  Level of consciousness: awake  Pain management: adequate  Airway patency: patent  Anesthetic complications: no  Cardiovascular status: acceptable  Respiratory status: acceptable  Hydration status: acceptable  Post anesthesia nausea and vomiting:  controlled  Final Post Anesthesia Temperature Assessment:  Normothermia (36.0-37.5 degrees C)      INITIAL Post-op Vital signs:   Vitals Value Taken Time   /74 07/17/22 1830   Temp 36.5 °C (97.7 °F) 07/17/22 1750   Pulse 86 07/17/22 1830   Resp 22 07/17/22 1830   SpO2 94 % 07/17/22 1830

## 2022-07-17 NOTE — ED NOTES
1330-Patient asked to try and give a urine; patient refused at this time. Dr Selma Mejia at bedside now; OR nurses also bedside now.

## 2022-07-17 NOTE — ED NOTES
Patient is being transferred to OR Holding/PACU. Report given to Sumaya Lal RN on [facility] for ordered procedure. Report consisted of the following information SBAR, ED Summary, Intake/Output, MAR, Recent Results, Med Rec Status and Cardiac Rhythm NSR. Patient transferred to receiving unit by: OR staff (RN or tech name). Outstanding consults needed: No     Next labs due: Yes    The following personal items will be sent with the patient during transfer to the floor:     All valuables:    Cardiac monitoring ordered: Yes    The following CURRENT information was reported to the receiving RN:    Code status: No Order at time of transfer    Last set of vital signs:  Vital Signs  Level of Consciousness: Alert (0) (07/17/22 1145)  Temp: 97.4 °F (36.3 °C) (07/17/22 1101)  Temp Source: Oral (07/17/22 1101)  Pulse (Heart Rate): 77 (07/17/22 1145)  Cardiac Rhythm: Sinus Rhythm (07/17/22 1155)  Resp Rate: 29 (07/17/22 1145)  BP: 136/83 (07/17/22 1145)  MAP (Monitor): 99 (07/17/22 1145)  MAP (Calculated): 101 (07/17/22 1145)  MEWS Score: 1 (07/17/22 1101)         Oxygen Therapy  O2 Sat (%): 98 % (07/17/22 1145)  Pulse via Oximetry: 78 beats per minute (07/17/22 1145)  O2 Device: None (Room air) (07/17/22 1145)      Last pain assessment:  Pain 1  Pain Scale 1: Numeric (0 - 10)  Pain Intensity 1: 8      Wounds: No     Urinary catheter: due to void  Is there a mak order: No     LDAs:       Peripheral IV 07/17/22 Left Antecubital (Active)       Peripheral IV 07/17/22 Left Antecubital (Active)   Site Assessment Clean, dry, & intact 07/17/22 1147   Phlebitis Assessment 0 07/17/22 1147   Infiltration Assessment 0 07/17/22 1147   Dressing Status Clean, dry, & intact 07/17/22 1147   Dressing Type Transparent 07/17/22 1147   Hub Color/Line Status Capped;Flushed;Patent 07/17/22 1147   Action Taken Blood drawn;Dressing reinforced 07/17/22 1147   Alcohol Cap Used No 07/17/22 1147         Opportunity for questions and clarification was provided.     Joseline Gómez RN

## 2022-07-17 NOTE — ED NOTES
1550-Patient THERESE to OR with OR nurses x 2; CHG wipes done by this nurse prior to transport; urine sent; upt negative; 2 bracelets and 1 ring placed in clear sealed bag and placed with clothes in patient belongings bag.

## 2022-07-17 NOTE — PROGRESS NOTES
Attempted to call the patient's daughter Eva Urrutia at [phone] several times on speaker phone in the Patient's ER room prior to surgery. Each time it answered with the message the party your are trying to reach is not available. Attempted to call post-op and I get message they the number is no longer in service. I have no other contact information at this time.

## 2022-07-17 NOTE — PERIOP NOTES
Phone number provided for daughter and patient is \"Not in [employer]". Patient has 2 yellow bracelets and 1 yellow ring that will be left with belongings and sent to room assigned. I will notify primary RN of items.

## 2022-07-17 NOTE — ANESTHESIA PREPROCEDURE EVALUATION
Relevant Problems   No relevant active problems       Anesthetic History   No history of anesthetic complications            Review of Systems / Medical History  Patient summary reviewed and pertinent labs reviewed    Pulmonary  Within defined limits                 Neuro/Psych   Within defined limits           Cardiovascular                  Exercise tolerance: >4 METS     GI/Hepatic/Renal  Within defined limits              Endo/Other        Obesity     Other Findings   Comments: EKG    Normal sinus rhythm   Normal ECG       CT abdomen/pelvis  Small volume pneumoperitoneum, concerning for hollow viscus perforation. A  dominant focus of gas in the anterior mid abdomen suggest the source may be the  transverse colon or mid small bowel.            Physical Exam    Airway  Mallampati: III  TM Distance: > 6 cm  Neck ROM: normal range of motion   Mouth opening: Diminished (comment)     Cardiovascular  Regular rate and rhythm,  S1 and S2 normal,  no murmur, click, rub, or gallop  Rhythm: regular  Rate: normal         Dental  No notable dental hx       Pulmonary  Breath sounds clear to auscultation               Abdominal  GI exam deferred       Other Findings            Anesthetic Plan    ASA: 2  Anesthesia type: general    Monitoring Plan: BIS      Induction: Intravenous and RSI  Anesthetic plan and risks discussed with: Patient

## 2022-07-17 NOTE — PROGRESS NOTES
Bedside and Verbal shift change report given to Gonzalez RN (oncoming nurse) by Calos Mayes RN (offgoing nurse). Report included the following information SBAR, Kardex, ED Summary, OR Summary, Intake/Output, MAR and Recent Results.

## 2022-07-17 NOTE — ED PROVIDER NOTES
EMERGENCY DEPARTMENT HISTORY AND PHYSICAL EXAM      Date: 7/17/2022  Patient Name: Lindsay Savage    History of Presenting Illness     Chief Complaint   Patient presents with    Abdominal Pain     right lower abdomen and right side pain ribs yesterday; Covid positive last saturday.  Chest Pain     right rib area and upper middle chest onset today         HPI: Lindsay Savage, 49 y.o. female presents to the ED with cc of abdominal pain and chest pain. She started having COVID-like symptoms about 9 days ago with cough and nausea. Her cough has improved and is now gone, however she continues to have shortness of breath, and over the past 24 hours is now developing sharp right-sided pain. She points to the right upper quadrant, however it radiates all the way up the right chest and down to the right lower quadrant. The pain is constant without identifiable exacerbating or alleviating factors. She denies any fevers, no vomiting. She does state that she has been constipated lately and has not had a bowel movement in 3 to 4 days. She reports some associated dysuria, no noticed hematuria. She did get both of her COVID vaccines. There are no other complaints, changes, or physical findings at this time. PCP: None    No current facility-administered medications on file prior to encounter. Current Outpatient Medications on File Prior to Encounter   Medication Sig Dispense Refill    nirmatrelvir-ritonavir (Paxlovid, EUA,) 150 mg x 2- 100 mg tablet Take as directed 1 Box 0    ondansetron (ZOFRAN ODT) 4 mg disintegrating tablet Take 1 Tablet by mouth every eight (8) hours as needed for Nausea or Vomiting. 10 Tablet 0    meclizine (ANTIVERT) 25 mg tablet Take 1 Tablet by mouth three (3) times daily as needed for Dizziness.  20 Tablet 0    dexAMETHasone (DECADRON) 2 mg tablet Take 10mg on day 1, then 8mg on day 2/3, then 6mg on day 4/5, then 4mg on day 6/7, then 2mg x 1 24 Tablet 0    tenofovir ALAFENAMIDE FUMARATE (Vemlidy) 25 mg tablet Take 25 mg by mouth daily. Past History     Past Medical History:  Hepatitis B, on medications and stable per patient and daughter    Past Surgical History:      Family History:  No family history on file. Social History:  Denies tobacco, alcohol or illicit drug use    Allergies:  No Known Allergies      Review of Systems   no fever  No ear pain  No eye pain  Reports shortness of breath  Reports chest pain  Reports abdominal pain  no dysuria  no leg pain  No rash  No lymphadenopathy  No weight gain    Physical Exam   Physical Exam  Constitutional:       Appearance: She is not toxic-appearing. Comments: Uncomfortable appearing   HENT:      Head: Normocephalic. Eyes:      Extraocular Movements: Extraocular movements intact. Cardiovascular:      Rate and Rhythm: Normal rate and regular rhythm. Pulmonary:      Effort: Pulmonary effort is normal.      Breath sounds: Normal breath sounds. Abdominal:      Palpations: Abdomen is soft. Tenderness: There is abdominal tenderness. Comments: Diffusely tender along the right-sided abdomen, more in the right upper quadrant than the right lower quadrant. No guarding. No rebound tenderness. Musculoskeletal:         General: No tenderness or deformity. Cervical back: Neck supple. Skin:     General: Skin is warm and dry. Neurological:      General: No focal deficit present. Mental Status: She is alert.    Psychiatric:         Mood and Affect: Mood normal.         Diagnostic Study Results     Labs -     Recent Results (from the past 24 hour(s))   EKG, 12 LEAD, INITIAL    Collection Time: 22 11:07 AM   Result Value Ref Range    Ventricular Rate 92 BPM    Atrial Rate 92 BPM    P-R Interval 122 ms    QRS Duration 72 ms    Q-T Interval 358 ms    QTC Calculation (Bezet) 442 ms    Calculated P Axis 60 degrees    Calculated R Axis 29 degrees    Calculated T Axis 77 degrees    Diagnosis Normal sinus rhythm  Normal ECG  No previous ECGs available     CBC WITH AUTOMATED DIFF    Collection Time: 07/17/22 11:37 AM   Result Value Ref Range    WBC 27.4 (H) 3.6 - 11.0 K/uL    RBC 4.72 3.80 - 5.20 M/uL    HGB 13.7 11.5 - 16.0 g/dL    HCT 40.1 35.0 - 47.0 %    MCV 85.0 80.0 - 99.0 FL    MCH 29.0 26.0 - 34.0 PG    MCHC 34.2 30.0 - 36.5 g/dL    RDW 12.7 11.5 - 14.5 %    PLATELET 216 (H) 929 - 400 K/uL    MPV 9.9 8.9 - 12.9 FL    NRBC 0.0 0  WBC    ABSOLUTE NRBC 0.00 0.00 - 0.01 K/uL    NEUTROPHILS 86 (H) 32 - 75 %    LYMPHOCYTES 5 (L) 12 - 49 %    MONOCYTES 9 5 - 13 %    EOSINOPHILS 0 0 - 7 %    BASOPHILS 0 0 - 1 %    IMMATURE GRANULOCYTES 0 0.0 - 0.5 %    ABS. NEUTROPHILS 23.5 (H) 1.8 - 8.0 K/UL    ABS. LYMPHOCYTES 1.4 0.8 - 3.5 K/UL    ABS. MONOCYTES 2.5 (H) 0.0 - 1.0 K/UL    ABS. EOSINOPHILS 0.0 0.0 - 0.4 K/UL    ABS. BASOPHILS 0.0 0.0 - 0.1 K/UL    ABS. IMM. GRANS. 0.0 0.00 - 0.04 K/UL    DF MANUAL      RBC COMMENTS NORMOCYTIC, NORMOCHROMIC     METABOLIC PANEL, COMPREHENSIVE    Collection Time: 07/17/22 11:37 AM   Result Value Ref Range    Sodium 129 (L) 136 - 145 mmol/L    Potassium 4.1 3.5 - 5.1 mmol/L    Chloride 95 (L) 97 - 108 mmol/L    CO2 26 21 - 32 mmol/L    Anion gap 8 5 - 15 mmol/L    Glucose 294 (H) 65 - 100 mg/dL    BUN 15 6 - 20 MG/DL    Creatinine 0.78 0.55 - 1.02 MG/DL    BUN/Creatinine ratio 19 12 - 20      GFR est AA >60 >60 ml/min/1.73m2    GFR est non-AA >60 >60 ml/min/1.73m2    Calcium 7.9 (L) 8.5 - 10.1 MG/DL    Bilirubin, total 0.5 0.2 - 1.0 MG/DL    ALT (SGPT) 54 12 - 78 U/L    AST (SGOT) 19 15 - 37 U/L    Alk.  phosphatase 73 45 - 117 U/L    Protein, total 6.7 6.4 - 8.2 g/dL    Albumin 2.7 (L) 3.5 - 5.0 g/dL    Globulin 4.0 2.0 - 4.0 g/dL    A-G Ratio 0.7 (L) 1.1 - 2.2     NT-PRO BNP    Collection Time: 07/17/22 11:37 AM   Result Value Ref Range    NT pro-BNP 38 <125 PG/ML   D DIMER    Collection Time: 07/17/22 11:37 AM   Result Value Ref Range    D-dimer 0.54 0.00 - 0.65 mg/L FEU       Radiologic Studies -   CT ABD PELV W CONT   Final Result   Small volume pneumoperitoneum, concerning for hollow viscus perforation. A   dominant focus of gas in the anterior mid abdomen suggest the source may be the   transverse colon or mid small bowel. I discussed this impression with Keo Holder MD at 1250 hours on   7/17/2022. XR CHEST PORT   Final Result   Pneumoperitoneum under the right hemidiaphragm. Clear lungs. Findings discussed with Dr. Nydia Larios by Dr. Jose Manuel Rajan via telephone at the   time of dictation. XR CHEST PA LAT    (Results Pending)     CT Results  (Last 48 hours)               07/17/22 1220  CT ABD PELV W CONT Final result    Impression:  Small volume pneumoperitoneum, concerning for hollow viscus perforation. A   dominant focus of gas in the anterior mid abdomen suggest the source may be the   transverse colon or mid small bowel. I discussed this impression with Keo Holder MD at 1250 hours on   7/17/2022. Narrative:  EXAM: CT ABD PELV W CONT       INDICATION: R sided abd pain       COMPARISON: None available        CONTRAST: 100 mL of Isovue-370. ORAL CONTRAST: None       TECHNIQUE:    Following the uneventful intravenous administration of contrast, thin axial   images were obtained through the abdomen and pelvis. Coronal and sagittal   reconstructions were generated. CT dose reduction was achieved through use of a   standardized protocol tailored for this examination and automatic exposure   control for dose modulation. FINDINGS:    LOWER THORAX: No significant abnormality in the incidentally imaged lower chest.   LIVER: No mass. BILIARY TREE: Gallbladder is within normal limits. CBD is not dilated. SPLEEN: within normal limits. PANCREAS: No mass or ductal dilatation. ADRENALS: Unremarkable. KIDNEYS: No mass, calculus, or hydronephrosis. STOMACH: Unremarkable.    SMALL BOWEL: No dilatation or wall thickening. COLON: No dilatation or wall thickening. APPENDIX: Not discretely identified   PERITONEUM: Trace ascites. Small volume pneumoperitoneum, with the dominant   focus in the anterior mid abdomen (3-58). RETROPERITONEUM: No lymphadenopathy or aortic aneurysm. REPRODUCTIVE ORGANS: Myomatous uterus. Adnexa are within normal limits. URINARY BLADDER: No mass or calculus. BONES: No destructive bone lesion. ABDOMINAL WALL: No mass or hernia. ADDITIONAL COMMENTS: N/A               CXR Results  (Last 48 hours)               07/17/22 1146  XR CHEST PORT Final result    Impression:  Pneumoperitoneum under the right hemidiaphragm. Clear lungs. Findings discussed with Dr. Memo Mendoza by Dr. Jessica Mcguire via telephone at the   time of dictation. Narrative:  EXAM:  XR CHEST PORT       INDICATION:  Chest pain       COMPARISON: July 9, 2022. TECHNIQUE: AP portable chest radiograph. FINDINGS: The lungs are clear. Pneumoperitoneum under the right hemidiaphragm. Heart size and mediastinal contours are normal. No pleural effusion or   pneumothorax. Bones are age-appropriate. Medical Decision Making   I am the first provider for this patient. I reviewed the vital signs, available nursing notes, past medical history, past surgical history, family history and social history. Vital Signs-Reviewed the patient's vital signs. Patient Vitals for the past 24 hrs:   Temp Pulse Resp BP SpO2   07/17/22 1313 97.7 °F (36.5 °C) 75 20 113/71 94 %   07/17/22 1200 -- 78 30 130/76 99 %   07/17/22 1145 -- 77 29 136/83 98 %   07/17/22 1101 97.4 °F (36.3 °C) 93 20 135/82 97 %         Provider Notes (Medical Decision Making):   50-year-old female presenting with right-sided abdominal pain.   The pain is sharp and constant radiating to the chest.  Differential includes pleuritic pain from recent COVID infection, bronchitis, pneumonia, musculoskeletal pain, with associated right upper quadrant tenderness concern for possible cholecystitis or cholelithiasis, hepatitis. She is afebrile and nontoxic-appearing. Chest pain is radiating from the right sided abdominal region, nonexertional, no associated nausea or diaphoresis, unlikely ACS, however given age, ACS work-up will also be initiated. ED Course:     Initial assessment performed. The patients presenting problems have been discussed, and they are in agreement with the care plan formulated and outlined with them. I have encouraged them to ask questions as they arise throughout their visit. Patient is given IV fluids and fentanyl IV. D-dimer is not significantly elevated, unlikely PE. Received a call from radiologist, free air under the diaphragm seen on chest x-ray. White count seen of 27.4. IV fluids as above, broad-spectrum antibiotics ordered. Patient is resting comfortably on reevaluation with stable vitals, CT abdomen pelvis ordered. Spoke with radiologist regarding CT findings as above. Patient resting comfortably reevaluation, feels improved after fentanyl, vital stable. Spoke with Dr. Leonila Arroyo who will come and evaluate the patient and admit. Critical Care Time:         Disposition:  Admit    PLAN:  1. Current Discharge Medication List        2.    Follow-up Information    None       Return to ED if worse     Diagnosis     Clinical Impression: Acute abdominal pain secondary to perforated viscus

## 2022-07-17 NOTE — PERIOP NOTES
TRANSFER - OUT REPORT:    Verbal report given to Timmy Flores RN(name) on Daniel Bar  being transferred to [facility](unit) for routine post - op       Report consisted of patients Situation, Background, Assessment and   Recommendations(SBAR). Information from the following report(s) SBAR, Kardex, ED Summary, OR Summary, Procedure Summary, Intake/Output, MAR, Accordion, Recent Results, Med Rec Status, Cardiac Rhythm NSR, Alarm Parameters , Pre Procedure Checklist, Procedure Verification and Quality Measures was reviewed with the receiving nurse. Opportunity for questions and clarification was provided.       Patient transported with:   O2 @ 3 liters  Registered Nurse

## 2022-07-17 NOTE — H&P
Surgery Consult    Subjective:      Lindsay Savage is a 49 y.o. female who is being seen for evaluation of abdominal pain. The pain is located in the RUQ with radiation to the right shoulder. .  Pain is described as sharp and stabbing and measures 10/10 in intensity. Onset of pain was 1 day ago. Aggravating factors include movement, activity and pressure. Alleviating factors include none. Associated symptoms include anorexia and nausea. Patient  Recently diagnosed with COVID     Patient Active Problem List    Diagnosis Date Noted    Pneumoperitoneum 07/17/2022     No past medical history on file. No past surgical history on file. Social History     Tobacco Use    Smoking status: Never Smoker    Smokeless tobacco: Never Used   Substance Use Topics    Alcohol use: Not on file      No family history on file. No current facility-administered medications for this encounter. Current Outpatient Medications   Medication Sig    nirmatrelvir-ritonavir (Paxlovid, EUA,) 150 mg x 2- 100 mg tablet Take as directed    ondansetron (ZOFRAN ODT) 4 mg disintegrating tablet Take 1 Tablet by mouth every eight (8) hours as needed for Nausea or Vomiting.  meclizine (ANTIVERT) 25 mg tablet Take 1 Tablet by mouth three (3) times daily as needed for Dizziness.  dexAMETHasone (DECADRON) 2 mg tablet Take 10mg on day 1, then 8mg on day 2/3, then 6mg on day 4/5, then 4mg on day 6/7, then 2mg x 1    tenofovir ALAFENAMIDE FUMARATE (Vemlidy) 25 mg tablet Take 25 mg by mouth daily. No Known Allergies    Review of Systems:    A comprehensive review of systems was negative except for that written in the History of Present Illness.     Objective:        Visit Vitals  /71   Pulse 75   Temp 97.7 °F (36.5 °C)   Resp 20   Ht 5' 4\" (1.626 m)   Wt 81.6 kg (180 lb)   SpO2 94%   BMI 30.90 kg/m²       Physical Exam:  GENERAL: alert, cooperative, no distress, appears stated age, LUNG: clear to auscultation bilaterally, HEART: regular rate and rhythm, S1, S2 normal, no murmur, click, rub or gallop, ABDOMEN: soft, very tender with peritoneal signs . Bowel sounds normal. No masses,  no organomegaly    Imaging:  images and reports reviewed    Lab/Data Review:  BMP:   Lab Results   Component Value Date/Time     (L) 07/17/2022 11:37 AM    K 4.1 07/17/2022 11:37 AM    CL 95 (L) 07/17/2022 11:37 AM    CO2 26 07/17/2022 11:37 AM    AGAP 8 07/17/2022 11:37 AM     (H) 07/17/2022 11:37 AM    BUN 15 07/17/2022 11:37 AM    CREA 0.78 07/17/2022 11:37 AM    GFRAA >60 07/17/2022 11:37 AM    GFRNA >60 07/17/2022 11:37 AM     CBC:   Lab Results   Component Value Date/Time    WBC 27.4 (H) 07/17/2022 11:37 AM    HGB 13.7 07/17/2022 11:37 AM    HCT 40.1 07/17/2022 11:37 AM     (H) 07/17/2022 11:37 AM         Assessment:     49year old with perforated viscus, leukocytosis and peritonitis on exam.  Given location of the air I suspect it is a perforated ulcer. Plan:     1. I recommend proceeding with Surgery:  Laparoscopy with possible Rich Herb patch, possible small bowel resection, possible colostomy    2. Discussed aspects of surgical intervention, methods, risks (including by not limited to infection, bleeding, hematoma, and perforation of the intestines or solid organs), and the risks of general anesthetic. The patient understands the risks; any and all questions were answered to the patient's satisfaction. 3. Patient does wish to proceed with surgery.

## 2022-07-17 NOTE — ED NOTES
Patient is being transferred to [facility] General Surgery, Room # 6477. Report given to Ramon blackwood RN on in3Depth for routine progression of care. Report consisted of the following information SBAR, ED Summary, Intake/Output, MAR, Recent Results, Med Rec Status and Cardiac Rhythm NSR. Patient transferred to receiving unit by: OR staff (RN or tech name). Outstanding consults needed: No     Next labs due: No     The following personal items will be sent with the patient during transfer to the floor: All valuables:    Cardiac monitoring ordered: Yes    The following CURRENT information was reported to the receiving RN:    Code status: No Order at time of transfer    Last set of vital signs:  Vital Signs  Level of Consciousness: Alert (0) (07/17/22 1313)  Temp: 97.7 °F (36.5 °C) (07/17/22 1313)  Temp Source: Oral (07/17/22 1313)  Pulse (Heart Rate): 83 (07/17/22 1500)  Heart Rate Source: Monitor (07/17/22 1313)  Cardiac Rhythm: Sinus Rhythm (07/17/22 1313)  Resp Rate: 22 (07/17/22 1500)  BP: 110/70 (07/17/22 1500)  MAP (Monitor): 83 (07/17/22 1500)  MAP (Calculated): 83 (07/17/22 1500)  MEWS Score: 1 (07/17/22 1313)         Oxygen Therapy  O2 Sat (%): 94 % (07/17/22 1500)  Pulse via Oximetry: 81 beats per minute (07/17/22 1500)  O2 Device: None (Room air) (07/17/22 1313)      Last pain assessment:  Pain 1  Pain Scale 1: Numeric (0 - 10)  Pain Intensity 1: 8      Wounds: No     Urinary catheter: voiding  Is there a mak order: No     LDAs:       Peripheral IV 07/17/22 Left Antecubital (Active)      Airway - Endotracheal Tube 07/17/22 (Active)       Opportunity for questions and clarification was provided.     Griselda Ripa, RN

## 2022-07-17 NOTE — PERIOP NOTES
Handoff Report from Operating Room to PACU    Report received from HEBERT Chadwick RN and Dg Sutton CRNA regarding Madison Tanner. Surgeon(s):  Apurva Lutz MD  And Procedure(s) (LRB):  LAPAROSCOPIC REPAIR OF DUODENAL ULCER (N/A)  confirmed   with allergies and dressings discussed. Anesthesia type, drugs, patient history, complications, estimated blood loss, vital signs, intake and output, and last pain medication, lines, reversal medications and temperature were reviewed.

## 2022-07-17 NOTE — BRIEF OP NOTE
Brief Postoperative Note    Patient: Sabrina Holland  YOB: 1975  MRN: [id number]    Date of Procedure: 7/17/2022     Pre-Op Diagnosis: FREE AIR    Post-Op Diagnosis: perforated duodenal ulcer      Procedure(s):  LAPAROSCOPIC REPAIR OF DUODENAL ULCER, LASHANDA PATCH    Surgeon(s):  Jean Carlos Silvestre MD    Surgical Assistant: Surg Asst-1: Reid Severin    Anesthesia: General     Estimated Blood Loss (mL): less than 50     Complications: None    Specimens:   ID Type Source Tests Collected by Time Destination   1 : PERITONEAL FLUID Peritoneal Fluid Abdomen ANAEROBIC/AEROBIC/GRAM STAIN Jean Carlos Silvestre MD 7/17/2022 1631 Microbiology        Implants: * No implants in log *    Drains:   Kunal-Maldonado Drain 07/17/22 Right Abdomen (Active)       Findings: 1) Murky fluid in the abdomen 2) perforation on the anterior surface of the 1st portion of the duodenum 3) repaired primarily and Hermon Deiters patch     Electronically Signed by Rena Arias MD on 7/17/2022 at 5:28 PM

## 2022-07-18 LAB
ANION GAP SERPL CALC-SCNC: 6 MMOL/L (ref 5–15)
ATRIAL RATE: 92 BPM
BUN SERPL-MCNC: 11 MG/DL (ref 6–20)
BUN/CREAT SERPL: 22 (ref 12–20)
CALCIUM SERPL-MCNC: 8.2 MG/DL (ref 8.5–10.1)
CALCULATED P AXIS, ECG09: 60 DEGREES
CALCULATED R AXIS, ECG10: 29 DEGREES
CALCULATED T AXIS, ECG11: 77 DEGREES
CHLORIDE SERPL-SCNC: 99 MMOL/L (ref 97–108)
CO2 SERPL-SCNC: 29 MMOL/L (ref 21–32)
COMMENT, HOLDF: NORMAL
CREAT SERPL-MCNC: 0.5 MG/DL (ref 0.55–1.02)
DIAGNOSIS, 93000: NORMAL
ERYTHROCYTE [DISTWIDTH] IN BLOOD BY AUTOMATED COUNT: 12.9 % (ref 11.5–14.5)
EST. AVERAGE GLUCOSE BLD GHB EST-MCNC: 148 MG/DL
GLUCOSE BLD STRIP.AUTO-MCNC: 225 MG/DL (ref 65–117)
GLUCOSE SERPL-MCNC: 210 MG/DL (ref 65–100)
HBA1C MFR BLD: 6.8 % (ref 4–5.6)
HCG SERPL QL: NEGATIVE
HCT VFR BLD AUTO: 38.5 % (ref 35–47)
HGB BLD-MCNC: 13 G/DL (ref 11.5–16)
MCH RBC QN AUTO: 29.3 PG (ref 26–34)
MCHC RBC AUTO-ENTMCNC: 33.8 G/DL (ref 30–36.5)
MCV RBC AUTO: 86.9 FL (ref 80–99)
NRBC # BLD: 0 K/UL (ref 0–0.01)
NRBC BLD-RTO: 0 PER 100 WBC
P-R INTERVAL, ECG05: 122 MS
PLATELET # BLD AUTO: 427 K/UL (ref 150–400)
PMV BLD AUTO: 9.9 FL (ref 8.9–12.9)
POTASSIUM SERPL-SCNC: 4.5 MMOL/L (ref 3.5–5.1)
Q-T INTERVAL, ECG07: 358 MS
QRS DURATION, ECG06: 72 MS
QTC CALCULATION (BEZET), ECG08: 442 MS
RBC # BLD AUTO: 4.43 M/UL (ref 3.8–5.2)
SAMPLES BEING HELD,HOLD: NORMAL
SARS-COV-2, COV2: NORMAL
SARS-COV-2, XPLCVT: DETECTED
SERVICE CMNT-IMP: ABNORMAL
SODIUM SERPL-SCNC: 134 MMOL/L (ref 136–145)
SOURCE, COVRS: ABNORMAL
VENTRICULAR RATE, ECG03: 92 BPM
WBC # BLD AUTO: 26.4 K/UL (ref 3.6–11)

## 2022-07-18 PROCEDURE — 74011250636 HC RX REV CODE- 250/636: Performed by: SURGERY

## 2022-07-18 PROCEDURE — C9113 INJ PANTOPRAZOLE SODIUM, VIA: HCPCS | Performed by: SURGERY

## 2022-07-18 PROCEDURE — 74011000258 HC RX REV CODE- 258: Performed by: SURGERY

## 2022-07-18 PROCEDURE — U0005 INFEC AGEN DETEC AMPLI PROBE: HCPCS

## 2022-07-18 PROCEDURE — 82962 GLUCOSE BLOOD TEST: CPT

## 2022-07-18 PROCEDURE — 74011250637 HC RX REV CODE- 250/637: Performed by: SURGERY

## 2022-07-18 PROCEDURE — 74011000250 HC RX REV CODE- 250: Performed by: SURGERY

## 2022-07-18 PROCEDURE — 36415 COLL VENOUS BLD VENIPUNCTURE: CPT

## 2022-07-18 PROCEDURE — 83036 HEMOGLOBIN GLYCOSYLATED A1C: CPT

## 2022-07-18 PROCEDURE — 84703 CHORIONIC GONADOTROPIN ASSAY: CPT

## 2022-07-18 PROCEDURE — 85027 COMPLETE CBC AUTOMATED: CPT

## 2022-07-18 PROCEDURE — 80048 BASIC METABOLIC PNL TOTAL CA: CPT

## 2022-07-18 PROCEDURE — 65270000029 HC RM PRIVATE

## 2022-07-18 RX ORDER — ZOLPIDEM TARTRATE 5 MG/1
5 TABLET ORAL
Status: DISCONTINUED | OUTPATIENT
Start: 2022-07-18 | End: 2022-07-22 | Stop reason: HOSPADM

## 2022-07-18 RX ORDER — MAGNESIUM SULFATE 100 %
4 CRYSTALS MISCELLANEOUS AS NEEDED
Status: DISCONTINUED | OUTPATIENT
Start: 2022-07-18 | End: 2022-07-22 | Stop reason: SDUPTHER

## 2022-07-18 RX ORDER — INSULIN LISPRO 100 [IU]/ML
INJECTION, SOLUTION INTRAVENOUS; SUBCUTANEOUS EVERY 6 HOURS
Status: DISCONTINUED | OUTPATIENT
Start: 2022-07-18 | End: 2022-07-21

## 2022-07-18 RX ORDER — HYDRALAZINE HYDROCHLORIDE 20 MG/ML
10 INJECTION INTRAMUSCULAR; INTRAVENOUS
Status: DISCONTINUED | OUTPATIENT
Start: 2022-07-18 | End: 2022-07-22 | Stop reason: HOSPADM

## 2022-07-18 RX ORDER — SODIUM CHLORIDE AND POTASSIUM CHLORIDE .9; .15 G/100ML; G/100ML
SOLUTION INTRAVENOUS CONTINUOUS
Status: DISCONTINUED | OUTPATIENT
Start: 2022-07-18 | End: 2022-07-22 | Stop reason: HOSPADM

## 2022-07-18 RX ADMIN — SODIUM CHLORIDE, PRESERVATIVE FREE 40 MG: 5 INJECTION INTRAVENOUS at 10:31

## 2022-07-18 RX ADMIN — PIPERACILLIN AND TAZOBACTAM 3.38 G: 3; .375 INJECTION, POWDER, LYOPHILIZED, FOR SOLUTION INTRAVENOUS at 20:31

## 2022-07-18 RX ADMIN — SODIUM CHLORIDE, PRESERVATIVE FREE 10 ML: 5 INJECTION INTRAVENOUS at 13:33

## 2022-07-18 RX ADMIN — HYDROMORPHONE HYDROCHLORIDE 0.5 MG: 1 INJECTION, SOLUTION INTRAMUSCULAR; INTRAVENOUS; SUBCUTANEOUS at 13:40

## 2022-07-18 RX ADMIN — SODIUM CHLORIDE, PRESERVATIVE FREE 40 MG: 5 INJECTION INTRAVENOUS at 20:31

## 2022-07-18 RX ADMIN — HYDROMORPHONE HYDROCHLORIDE 0.5 MG: 1 INJECTION, SOLUTION INTRAMUSCULAR; INTRAVENOUS; SUBCUTANEOUS at 11:07

## 2022-07-18 RX ADMIN — SODIUM CHLORIDE, PRESERVATIVE FREE 10 ML: 5 INJECTION INTRAVENOUS at 05:46

## 2022-07-18 RX ADMIN — POTASSIUM CHLORIDE AND SODIUM CHLORIDE: 900; 150 INJECTION, SOLUTION INTRAVENOUS at 13:33

## 2022-07-18 RX ADMIN — PIPERACILLIN AND TAZOBACTAM 3.38 G: 3; .375 INJECTION, POWDER, LYOPHILIZED, FOR SOLUTION INTRAVENOUS at 03:01

## 2022-07-18 RX ADMIN — SODIUM CHLORIDE, PRESERVATIVE FREE 10 ML: 5 INJECTION INTRAVENOUS at 21:52

## 2022-07-18 RX ADMIN — PIPERACILLIN AND TAZOBACTAM 3.38 G: 3; .375 INJECTION, POWDER, LYOPHILIZED, FOR SOLUTION INTRAVENOUS at 11:06

## 2022-07-18 RX ADMIN — HYDROMORPHONE HYDROCHLORIDE 0.5 MG: 1 INJECTION, SOLUTION INTRAMUSCULAR; INTRAVENOUS; SUBCUTANEOUS at 02:47

## 2022-07-18 RX ADMIN — POTASSIUM CHLORIDE AND SODIUM CHLORIDE: 900; 150 INJECTION, SOLUTION INTRAVENOUS at 20:37

## 2022-07-18 RX ADMIN — ZOLPIDEM TARTRATE 5 MG: 5 TABLET ORAL at 20:23

## 2022-07-18 RX ADMIN — HYDROMORPHONE HYDROCHLORIDE 0.5 MG: 1 INJECTION, SOLUTION INTRAMUSCULAR; INTRAVENOUS; SUBCUTANEOUS at 18:07

## 2022-07-18 NOTE — PROGRESS NOTES
End of Shift Note    Bedside shift change report given to Emily (oncoming nurse) by Aiden Iglesias RN (offgoing nurse). Report included the following information SBAR, Kardex, Intake/Output and MAR    Shift worked:  7p-7a     Shift summary and any significant changes:     Patient OOB and ambulated to bathroom and is voiding. No complaints of SOB. Daughter at bedside throughout night     Concerns for physician to address:  none     Zone phone for oncoming shift:   [phone]       Activity:  Activity Level: Up with Assistance  Number times ambulated in hallways past shift: 0  Number of times OOB to chair past shift: 1    Cardiac:   Cardiac Monitoring: No      Cardiac Rhythm: Sinus Rhythm    Access:   Current line(s): PIV     Genitourinary:   Urinary status: voiding    Respiratory:   O2 Device: None (Room air)  Chronic home O2 use?: NO  Incentive spirometer at bedside: NO       GI:  Last Bowel Movement Date: 07/16/22  Current diet:  DIET NPO  Passing flatus: YES  Tolerating current diet: YES       Pain Management:   Patient states pain is manageable on current regimen: YES    Skin:  Luis Antonio Score: 19  Interventions: speciality bed    Patient Safety:  Fall Score:  Total Score: 3  Interventions: gripper socks  High Fall Risk: Yes    Length of Stay:  Expected LOS: - - -  Actual LOS: Mahendra Lopez, RN

## 2022-07-18 NOTE — PROGRESS NOTES
Problem: Falls - Risk of  Goal: *Absence of Falls  Description: Document Anita Phelan Fall Risk and appropriate interventions in the flowsheet.   7/18/2022 1256 by Aysha Goins RN  Outcome: Progressing Towards Goal  Note: Fall Risk Interventions:  Mobility Interventions: Patient to call before getting OOB         Medication Interventions: Patient to call before getting OOB,Teach patient to arise slowly    Elimination Interventions: Patient to call for help with toileting needs           7/18/2022 1256 by Aysha Goins RN  Outcome: Progressing Towards Goal  Note: Fall Risk Interventions:  Mobility Interventions: Patient to call before getting OOB         Medication Interventions: Patient to call before getting OOB,Teach patient to arise slowly    Elimination Interventions: Patient to call for help with toileting needs              Problem: Pain  Goal: *Control of Pain  Outcome: Progressing Towards Goal

## 2022-07-18 NOTE — PROGRESS NOTES
Problem: Falls - Risk of  Goal: *Absence of Falls  Description: Document Morrell Reason Fall Risk and appropriate interventions in the flowsheet.   Outcome: Progressing Towards Goal  Note: Fall Risk Interventions:  Mobility Interventions: Bed/chair exit alarm         Medication Interventions: Teach patient to arise slowly,Patient to call before getting OOB    Elimination Interventions: Bed/chair exit alarm              Problem: Patient Education: Go to Patient Education Activity  Goal: Patient/Family Education  Outcome: Progressing Towards Goal     Problem: Surgical Pathway Day of Surgery  Goal: Consults, if ordered  Outcome: Progressing Towards Goal  Goal: Nutrition/Diet  Outcome: Progressing Towards Goal  Goal: Respiratory  Outcome: Progressing Towards Goal  Goal: Treatments/Interventions/Procedures  Outcome: Progressing Towards Goal  Goal: Psychosocial  Outcome: Progressing Towards Goal

## 2022-07-18 NOTE — PROGRESS NOTES
Information obtained from [facility] that patient has a [employer] plan and will need to transfer to a [address] [place] [address] when medically stable to transfer. Information to be provided to surgeon about arranging a transfer or discharging patient if medically stable. Neto Barboza, [address] [facility] - [address] [place]  [employer] ACP Facilitator  Zone Phone: [phone]

## 2022-07-18 NOTE — PROGRESS NOTES
SURGERY PROGRESS NOTE      Admit Date: 2022    POD 1 Day Post-Op    Procedure: Procedure(s):  LAPAROSCOPIC REPAIR OF DUODENAL ULCER      Subjective:     Patient pull NG out this morning due to nose irritation. Abdominal pain is controlled. Denies nausea. Objective:     Visit Vitals  BP (!) 156/85 (BP 1 Location: Right upper arm, BP Patient Position: At rest)   Pulse 84   Temp 97.6 °F (36.4 °C)   Resp 16   Ht 5' 4\" (1.626 m)   Wt 81.6 kg (180 lb)   SpO2 94%   BMI 30.90 kg/m²        Temp (24hrs), Av.8 °F (36.6 °C), Min:97.5 °F (36.4 °C), Max:98.1 °F (36.7 °C)      701 - 1900  In: -   Out: 320 [Urine:300; Drains:20]  1901 -  0700  In: 1020 [I.V.:1020]  Out: 180 [Drains:30]    Physical Exam:    General:  alert, cooperative, no distress, appears stated age   Abdomen: soft, bowel sounds hypoactive, non-tender    KEY - 30 cc overnight. serosanguinous   Incision:   healing well, no drainage, no hernia, no seroma, no swelling, no dehiscence, incision well approximated           Lab Results   Component Value Date/Time    WBC 26.4 (H) 2022 02:43 AM    HGB 13.0 2022 02:43 AM    HCT 38.5 2022 02:43 AM    PLATELET 407 (H)  02:43 AM    MCV 86.9 2022 02:43 AM     Lab Results   Component Value Date/Time    GFR est non-AA >60 2022 02:43 AM    GFR est AA >60 2022 02:43 AM    Creatinine 0.50 (L) 2022 02:43 AM    BUN 11 2022 02:43 AM    Sodium 134 (L) 2022 02:43 AM    Potassium 4.5 2022 02:43 AM    Chloride 99 2022 02:43 AM    CO2 29 2022 02:43 AM       Assessment:     POD#1 repair of perforated duodenal ulcer. Patient is recovering as expected.   Would have liked to have maintained NG to keep stomach decompressed as the ulcer heals but, I don't think it is worth re-inserting it    Plan:     NPO  PPI  UGI on

## 2022-07-19 LAB
ANION GAP SERPL CALC-SCNC: 5 MMOL/L (ref 5–15)
BUN SERPL-MCNC: 9 MG/DL (ref 6–20)
BUN/CREAT SERPL: 16 (ref 12–20)
CALCIUM SERPL-MCNC: 8.1 MG/DL (ref 8.5–10.1)
CHLORIDE SERPL-SCNC: 102 MMOL/L (ref 97–108)
CO2 SERPL-SCNC: 28 MMOL/L (ref 21–32)
CREAT SERPL-MCNC: 0.55 MG/DL (ref 0.55–1.02)
ERYTHROCYTE [DISTWIDTH] IN BLOOD BY AUTOMATED COUNT: 13.2 % (ref 11.5–14.5)
GLUCOSE SERPL-MCNC: 153 MG/DL (ref 65–100)
HCT VFR BLD AUTO: 37.7 % (ref 35–47)
HGB BLD-MCNC: 12.6 G/DL (ref 11.5–16)
MCH RBC QN AUTO: 29 PG (ref 26–34)
MCHC RBC AUTO-ENTMCNC: 33.4 G/DL (ref 30–36.5)
MCV RBC AUTO: 86.7 FL (ref 80–99)
NRBC # BLD: 0 K/UL (ref 0–0.01)
NRBC BLD-RTO: 0 PER 100 WBC
PLATELET # BLD AUTO: 380 K/UL (ref 150–400)
PMV BLD AUTO: 9.8 FL (ref 8.9–12.9)
POTASSIUM SERPL-SCNC: 4.3 MMOL/L (ref 3.5–5.1)
RBC # BLD AUTO: 4.35 M/UL (ref 3.8–5.2)
SODIUM SERPL-SCNC: 135 MMOL/L (ref 136–145)
WBC # BLD AUTO: 20.5 K/UL (ref 3.6–11)

## 2022-07-19 PROCEDURE — 74011250636 HC RX REV CODE- 250/636: Performed by: SURGERY

## 2022-07-19 PROCEDURE — 74011250637 HC RX REV CODE- 250/637: Performed by: SURGERY

## 2022-07-19 PROCEDURE — 74011000250 HC RX REV CODE- 250: Performed by: SURGERY

## 2022-07-19 PROCEDURE — 65270000029 HC RM PRIVATE

## 2022-07-19 PROCEDURE — C9113 INJ PANTOPRAZOLE SODIUM, VIA: HCPCS | Performed by: SURGERY

## 2022-07-19 PROCEDURE — 0DU907Z SUPPLEMENT DUODENUM WITH AUTOLOGOUS TISSUE SUBSTITUTE, OPEN APPROACH: ICD-10-PCS | Performed by: SURGERY

## 2022-07-19 PROCEDURE — 80048 BASIC METABOLIC PNL TOTAL CA: CPT

## 2022-07-19 PROCEDURE — 99232 SBSQ HOSP IP/OBS MODERATE 35: CPT

## 2022-07-19 PROCEDURE — 36415 COLL VENOUS BLD VENIPUNCTURE: CPT

## 2022-07-19 PROCEDURE — 44238 UNLISTED LAPS PX INTESTINE: CPT | Performed by: SURGERY

## 2022-07-19 PROCEDURE — 74011000258 HC RX REV CODE- 258: Performed by: SURGERY

## 2022-07-19 PROCEDURE — 85027 COMPLETE CBC AUTOMATED: CPT

## 2022-07-19 RX ADMIN — HYDROMORPHONE HYDROCHLORIDE 0.5 MG: 1 INJECTION, SOLUTION INTRAMUSCULAR; INTRAVENOUS; SUBCUTANEOUS at 12:59

## 2022-07-19 RX ADMIN — ZOLPIDEM TARTRATE 5 MG: 5 TABLET ORAL at 20:01

## 2022-07-19 RX ADMIN — POTASSIUM CHLORIDE AND SODIUM CHLORIDE: 900; 150 INJECTION, SOLUTION INTRAVENOUS at 22:11

## 2022-07-19 RX ADMIN — POTASSIUM CHLORIDE AND SODIUM CHLORIDE: 900; 150 INJECTION, SOLUTION INTRAVENOUS at 13:07

## 2022-07-19 RX ADMIN — PIPERACILLIN AND TAZOBACTAM 3.38 G: 3; .375 INJECTION, POWDER, LYOPHILIZED, FOR SOLUTION INTRAVENOUS at 12:50

## 2022-07-19 RX ADMIN — SODIUM CHLORIDE, PRESERVATIVE FREE 40 MG: 5 INJECTION INTRAVENOUS at 10:13

## 2022-07-19 RX ADMIN — HYDROMORPHONE HYDROCHLORIDE 0.5 MG: 1 INJECTION, SOLUTION INTRAMUSCULAR; INTRAVENOUS; SUBCUTANEOUS at 10:13

## 2022-07-19 RX ADMIN — HYDROMORPHONE HYDROCHLORIDE 0.5 MG: 1 INJECTION, SOLUTION INTRAMUSCULAR; INTRAVENOUS; SUBCUTANEOUS at 03:54

## 2022-07-19 RX ADMIN — SODIUM CHLORIDE, PRESERVATIVE FREE 10 ML: 5 INJECTION INTRAVENOUS at 14:00

## 2022-07-19 RX ADMIN — PIPERACILLIN AND TAZOBACTAM 3.38 G: 3; .375 INJECTION, POWDER, LYOPHILIZED, FOR SOLUTION INTRAVENOUS at 03:53

## 2022-07-19 RX ADMIN — PIPERACILLIN AND TAZOBACTAM 3.38 G: 3; .375 INJECTION, POWDER, LYOPHILIZED, FOR SOLUTION INTRAVENOUS at 19:57

## 2022-07-19 RX ADMIN — HYDROMORPHONE HYDROCHLORIDE 0.5 MG: 1 INJECTION, SOLUTION INTRAMUSCULAR; INTRAVENOUS; SUBCUTANEOUS at 19:57

## 2022-07-19 RX ADMIN — SODIUM CHLORIDE, PRESERVATIVE FREE 10 ML: 5 INJECTION INTRAVENOUS at 06:06

## 2022-07-19 RX ADMIN — SODIUM CHLORIDE, PRESERVATIVE FREE 40 MG: 5 INJECTION INTRAVENOUS at 20:00

## 2022-07-19 NOTE — PROGRESS NOTES
End of Shift Note    Bedside shift change [facility] by Patria Hayes RN (offgoing nurse). Report included the following information SBAR, Kardex, Procedure Summary, Intake/Output and Recent Results    Shift worked:  7 pm - 7 am     Shift summary and any significant changes:     No other changes asides lab results. Concerns for physician to address:  positive COVID, reported on [facility] care Anastasiya Cox MD     Zone phone for oncoming shift:          Activity:  Activity Level: Bath Room Privileges  Number times ambulated in hallways past shift:0  Number of times OOB to chair past shift:0    Cardiac:   Cardiac Monitoring: No     Cardiac Rhythm: Sinus Rhythm    Access:   Current line(s): PIV     Genitourinary:   Urinary status: voiding    Respiratory:   O2 Device: None (Room air)  Chronic home O2 use?: NO  Incentive spirometer at bedside: YES       GI:  Last Bowel Movement Date: 07/16/22  Current diet:  DIET NPO  Passing flatus:Yes  Tolerating current diet:Yes       Pain Management:   Patient states pain is manageable on current regimen: YES    Skin:  Luis Antonio Score: 19  Interventions: increase time out of bed    Patient Safety:  Fall Score:  Total Score: 3  Interventions: bed/chair alarm, gripper socks and pt to call before getting OOB  High Fall Risk: Yes    Length of Stay:  Expected LOS: - - -  Actual LOS: 2      Patria Hayes RN

## 2022-07-19 NOTE — PROGRESS NOTES
SURGERY PROGRESS NOTE      Admit Date: 2022    POD 2 Days Post-Op    Procedure: Procedure(s):  LAPAROSCOPIC REPAIR OF DUODENAL ULCER      Subjective:     Patient complains of RLQ pain. Denies nausea. Passing flatus. Has cough. Denies fevers and SOB. Has had 6 loose BMs. Objective:     Visit Vitals  /77   Pulse 90   Temp 98.1 °F (36.7 °C)   Resp 15   Ht 5' 4\" (1.626 m)   Wt 81.6 kg (180 lb)   SpO2 93%   BMI 30.90 kg/m²        Temp (24hrs), Av °F (36.7 °C), Min:98 °F (36.7 °C), Max:98.1 °F (36.7 °C)      701 - 1900  In: -   Out: 800 [Urine:800]  1901 - 700  In: -   Out: 4170 [Urine:1400; Drains:180]    Physical Exam:    General:  alert, cooperative, no distress, appears stated age   Abdomen: soft, Distended bowel sounds hypoactive, Mildly tender in RLQ     serosanguinous   Incision:   healing well, no drainage, no erythema, no hernia, no seroma, no swelling, no dehiscence, incision well approximated           Lab Results   Component Value Date/Time    WBC 20.5 (H) 2022 04:11 AM    HGB 12.6 2022 04:11 AM    HCT 37.7 2022 04:11 AM    PLATELET 944  04:11 AM    MCV 86.7 2022 04:11 AM     Lab Results   Component Value Date/Time    GFR est non-AA >60 2022 04:11 AM    GFR est AA >60 2022 04:11 AM    Creatinine 0.55 2022 04:11 AM    BUN 9 2022 04:11 AM    Sodium 135 (L) 2022 04:11 AM    Potassium 4.3 2022 04:11 AM    Chloride 102 2022 04:11 AM    CO2 28 2022 04:11 AM       Assessment:     49year old status post perforated duodenal ulcer. She also is Covid positive with symptoms. I don't think the patient is stable for transfer at this time.        Plan:     1) continue present treatment

## 2022-07-19 NOTE — PROGRESS NOTES
Problem: Falls - Risk of  Goal: *Absence of Falls  Description: Document Kiya Maria Fall Risk and appropriate interventions in the flowsheet.   Outcome: Progressing Towards Goal  Note: Fall Risk Interventions:  Mobility Interventions: Patient to call before getting OOB         Medication Interventions: Evaluate medications/consider consulting pharmacy,Patient to call before getting OOB,Teach patient to arise slowly    Elimination Interventions: Call light in reach,Toilet paper/wipes in reach,Patient to call for help with toileting needs              Problem: Pain  Goal: *Control of Pain  7/18/2022 2300 by Yessica Dodge RN  Outcome: Progressing Towards Goal  Note:     Assess pain characteristics (eg: Intensity scale; onset; location; quality; severity; duration; frequency;   radiation)                Assess pain management - barriers (eg: Past pain   experiences)                Identify pain expectations (eg: Patient's pain goal; somatic experiences; behavioral changes;   affect)                Identify pain medication concerns (eg: Cultural considerations; addiction   concerns)                Support system identification (eg: Caregiver; community resource; family; friends; Nondenominational; support   group)                Monitor for change in patient condition (eg: Vital signs changes; changes in level of consciousness; nausea; behavioral   changes)                Medication side-effect   assessment                Pain-relief response reassessment (eg: Frequency based on route of administration;   effectiveness    7/18/2022 2050 by Yessica Dodge RN  Outcome: Progressing Towards Goal  7/18/2022 2050 by Yessica Dodge RN  Outcome: Progressing Towards Goal     Problem: Pain  Goal: *Control of Pain  7/18/2022 2300 by Yessica Dodge RN  Outcome: Progressing Towards Goal  Note:     Assess pain characteristics (eg: Intensity scale; onset; location; quality; severity; duration; frequency;   radiation)    Assess pain management - barriers (eg: Past pain   experiences)    Identify pain expectations (eg: Patient's pain goal; somatic experiences; behavioral changes;   affect)    Identify pain medication concerns (eg: Cultural considerations; addiction   concerns)    Support system identification (eg: Caregiver; community resource; family; friends; Yazidism; support   group)    Monitor for change in patient condition (eg: Vital signs changes; changes in level of consciousness; nausea; behavioral   changes)    Medication side-effect   assessment      Pain-relief response reassessment (eg: Frequency based on route of administration;   effectiveness    7/18/2022 2050 by Larry Mccullough RN  Outcome: Progressing Towards Goal  7/18/2022 2050 by Larry Mccullough RN  Outcome: Progressing Towards Goal

## 2022-07-19 NOTE — DIABETES MGMT
[facility]    CLINICAL NURSE SPECIALIST CONSULT     Initial Presentation   Mary Morse is a 49 y.o. female admitted 7/19/22 with abdominal pain and SOB. The patient was recently admitted for  Covid symptoms (7/9/22) after testing positive from a home test. Admission glucose 294. Creatine 0.78. GFR >60. A1c 6.8%      HX: History reviewed. No pertinent past medical history. INITIAL DX:   Pneumoperitoneum [K66.8]     Current Treatment     TX: Correction insulin. . Protonix. Zosyn. Consulted by Provider for advanced diabetes nursing assessment and care for:   [] Transitioning off [place] Heck   [x] Inpatient management strategy  [x] Home management assessment  [] Survival skill education    Hospital Course   Clinical progress has been complicated by Covid infection. Diabetes History   There is no hx of diabetes on file. Diabetes Medication History  Key Antihyperglycemic Medications     Patient is on no antihyperglycemic meds. Subjective   Did not enter room due to Covid precautions. Clinician and visitors in with patient. Patient did not answer phone. Cell phone number on file for  , is no longer in service.       Objective   Physical exam  General Obese female; ill-appearing ( patient viewed throw glass window)  Neuro  Alert, oriented   Vital Signs   Visit Vitals  /77   Pulse 90   Temp 98.1 °F (36.7 °C)   Resp 15   Ht 5' 4\" (1.626 m)   Wt 81.6 kg (180 lb)   SpO2 93%   BMI 30.90 kg/m²       Laboratory  Recent Labs     07/19/22  0411 07/18/22  0243 07/17/22  1137   * 210* 294*   AGAP 5 6 8   WBC 20.5* 26.4* 27.4*   CREA 0.55 0.50* 0.78   GFRNA >60 >60 >60   AST  --   --  19   ALT  --   --  54       Factors impacting BG management  Factor Dose Comments   Nutrition:  Standard meals     NPO    Pain Dilaudid PRN Taking   Infection Zosyn 3.375 Q 8 hours Covid    Other:   Kidney function     Normal      Blood glucose pattern      Significant diabetes-related events over the past 24-72 hours      Assessment and Plan   Nursing Diagnosis Risk for unstable blood glucose pattern   Nursing Intervention Domain 0978 Decision-making Support   Nursing Interventions Examined current inpatient diabetes/blood glucose control   Explored factors facilitating and impeding inpatient management  Explored corrective strategies with patient and responsible inpatient provider   Informed patient of rational for insulin strategy while hospitalized     Evaluation   There is no hx of diabetes on file in the medical health record for this 49year old female patient. The patient had a recent admission 7/9/22, and was not discharged on any anti glycemic medications. The patient has a current A1c of 6.8% and an admission glucose of 294. Although this meets the ADA requirements for a positive dx of diabetes, the patient has not required anti glycemic medication. She received a dose of dexamethasone 2 days ago. Her morning BG was 153 today. I recommend that the patient follow-up with her PCP, to discuss her A1c. I do not suspect the patient will require insulin at this time. Diabetes management will monitor BG's today and if BG's remain stable we will sign off in the morning. Recommendations     1. Use Normal sensittivity correction scale.      Billing Code(s)   [] [id number] IP subsequent hospital care - 35 minutes [] 95160 Prolonged Services - 65 minutes [] 23449 Prolonged Services - 110 minutes  [x] 07358 IP subsequent hospital care - 25 minutes [] 42682 Prolonged Services - 55 minutes [] 87552 Prolonged Services - 100 minutes  [] 74134 IP subsequent hospital care - 15 minutes [] 34866 Prolonged Services - 45 minutes [] 46168 Prolonged Services - 90 minutes    Before making these care recommendations, I personally reviewed the hospitalization record, including notes, laboratory & diagnostic data and current medications, and examined the patient at the bedside (circumstances permitting) before making care recommendations. More than fifty (50) percent of the time was spent in patient counseling and/or care coordination.   Total minutes: 25 minutes    Marisabel Broussard CNS  Diabetes Clinical Nurse Specialist  [facility]  Access via [employer]

## 2022-07-19 NOTE — PROGRESS NOTES
Problem: Falls - Risk of  Goal: *Absence of Falls  Description: Document Anita Phelan Fall Risk and appropriate interventions in the flowsheet.   Outcome: Progressing Towards Goal  Note: Fall Risk Interventions:  Mobility Interventions: Patient to call before getting OOB         Medication Interventions: Evaluate medications/consider consulting pharmacy,Patient to call before getting OOB,Teach patient to arise slowly    Elimination Interventions: Call light in reach,Toilet paper/wipes in reach,Patient to call for help with toileting needs              Problem: Pain  Goal: *Control of Pain  7/18/2022 2050 by Domenica Goldman RN  Outcome: Progressing Towards Goal  7/18/2022 2050 by Domenica Goldman RN  Outcome: Progressing Towards Goal      Assess pain characteristics (eg: Intensity scale; onset; location; quality; severity; duration; frequency;   radiation)      Assess pain management - barriers (eg: Past pain   experiences)      Identify pain expectations (eg: Patient's pain goal; somatic experiences; behavioral changes;   affect)      Identify pain medication concerns (eg: Cultural considerations; addiction   concerns)      Support system identification (eg: Caregiver; community resource; family; friends; Scientology; support   group)      Monitor for change in patient condition (eg: Vital signs changes; changes in level of consciousness; nausea; behavioral   changes)      Medication side-effect   assessment      Pain-relief response reassessment (eg: Frequency based on route of administration;   effectiveness

## 2022-07-19 NOTE — PROGRESS NOTES
Physician notified by Nurse Manager that patient is a [address] patient and would need to transfer to [facility] AND [facility] facility. Attending physician has documented that patient is not currently stable for transfer. CM to follow and assist with discharge planning as needed. Discharge Location  Patient Expects to be Discharged to[de-identified] [facility].  [facility], [address] - ED [facility]  [employer] ACP Facilitator  Zone Phone: [phone]

## 2022-07-20 LAB
ANION GAP SERPL CALC-SCNC: 6 MMOL/L (ref 5–15)
BUN SERPL-MCNC: 8 MG/DL (ref 6–20)
BUN/CREAT SERPL: 17 (ref 12–20)
CALCIUM SERPL-MCNC: 8.3 MG/DL (ref 8.5–10.1)
CHLORIDE SERPL-SCNC: 104 MMOL/L (ref 97–108)
CO2 SERPL-SCNC: 28 MMOL/L (ref 21–32)
CREAT SERPL-MCNC: 0.46 MG/DL (ref 0.55–1.02)
ERYTHROCYTE [DISTWIDTH] IN BLOOD BY AUTOMATED COUNT: 13.2 % (ref 11.5–14.5)
GLUCOSE BLD STRIP.AUTO-MCNC: 101 MG/DL (ref 65–117)
GLUCOSE BLD STRIP.AUTO-MCNC: 114 MG/DL (ref 65–117)
GLUCOSE BLD STRIP.AUTO-MCNC: 117 MG/DL (ref 65–117)
GLUCOSE BLD STRIP.AUTO-MCNC: 123 MG/DL (ref 65–117)
GLUCOSE SERPL-MCNC: 129 MG/DL (ref 65–100)
HCT VFR BLD AUTO: 37.1 % (ref 35–47)
HGB BLD-MCNC: 11.9 G/DL (ref 11.5–16)
MCH RBC QN AUTO: 28.4 PG (ref 26–34)
MCHC RBC AUTO-ENTMCNC: 32.1 G/DL (ref 30–36.5)
MCV RBC AUTO: 88.5 FL (ref 80–99)
NRBC # BLD: 0 K/UL (ref 0–0.01)
NRBC BLD-RTO: 0 PER 100 WBC
PLATELET # BLD AUTO: 337 K/UL (ref 150–400)
PMV BLD AUTO: 9.6 FL (ref 8.9–12.9)
POTASSIUM SERPL-SCNC: 4.1 MMOL/L (ref 3.5–5.1)
RBC # BLD AUTO: 4.19 M/UL (ref 3.8–5.2)
SERVICE CMNT-IMP: ABNORMAL
SERVICE CMNT-IMP: NORMAL
SODIUM SERPL-SCNC: 138 MMOL/L (ref 136–145)
WBC # BLD AUTO: 15.8 K/UL (ref 3.6–11)

## 2022-07-20 PROCEDURE — 82962 GLUCOSE BLOOD TEST: CPT

## 2022-07-20 PROCEDURE — 85027 COMPLETE CBC AUTOMATED: CPT

## 2022-07-20 PROCEDURE — 80048 BASIC METABOLIC PNL TOTAL CA: CPT

## 2022-07-20 PROCEDURE — 65270000029 HC RM PRIVATE

## 2022-07-20 PROCEDURE — 94760 N-INVAS EAR/PLS OXIMETRY 1: CPT

## 2022-07-20 PROCEDURE — 74011000258 HC RX REV CODE- 258: Performed by: SURGERY

## 2022-07-20 PROCEDURE — 74011000250 HC RX REV CODE- 250: Performed by: SURGERY

## 2022-07-20 PROCEDURE — 36415 COLL VENOUS BLD VENIPUNCTURE: CPT

## 2022-07-20 PROCEDURE — C9113 INJ PANTOPRAZOLE SODIUM, VIA: HCPCS | Performed by: SURGERY

## 2022-07-20 PROCEDURE — 74011250636 HC RX REV CODE- 250/636: Performed by: SURGERY

## 2022-07-20 RX ADMIN — SODIUM CHLORIDE, PRESERVATIVE FREE 40 MG: 5 INJECTION INTRAVENOUS at 20:59

## 2022-07-20 RX ADMIN — PIPERACILLIN AND TAZOBACTAM 3.38 G: 3; .375 INJECTION, POWDER, LYOPHILIZED, FOR SOLUTION INTRAVENOUS at 20:52

## 2022-07-20 RX ADMIN — HYDROMORPHONE HYDROCHLORIDE 0.5 MG: 1 INJECTION, SOLUTION INTRAMUSCULAR; INTRAVENOUS; SUBCUTANEOUS at 01:50

## 2022-07-20 RX ADMIN — SODIUM CHLORIDE, PRESERVATIVE FREE 10 ML: 5 INJECTION INTRAVENOUS at 14:00

## 2022-07-20 RX ADMIN — POTASSIUM CHLORIDE AND SODIUM CHLORIDE: 900; 150 INJECTION, SOLUTION INTRAVENOUS at 07:42

## 2022-07-20 RX ADMIN — PIPERACILLIN AND TAZOBACTAM 3.38 G: 3; .375 INJECTION, POWDER, LYOPHILIZED, FOR SOLUTION INTRAVENOUS at 12:43

## 2022-07-20 RX ADMIN — HYDROMORPHONE HYDROCHLORIDE 0.5 MG: 1 INJECTION, SOLUTION INTRAMUSCULAR; INTRAVENOUS; SUBCUTANEOUS at 21:03

## 2022-07-20 RX ADMIN — SODIUM CHLORIDE, PRESERVATIVE FREE 40 MG: 5 INJECTION INTRAVENOUS at 11:15

## 2022-07-20 RX ADMIN — POTASSIUM CHLORIDE AND SODIUM CHLORIDE: 900; 150 INJECTION, SOLUTION INTRAVENOUS at 18:31

## 2022-07-20 RX ADMIN — HYDROMORPHONE HYDROCHLORIDE 0.5 MG: 1 INJECTION, SOLUTION INTRAMUSCULAR; INTRAVENOUS; SUBCUTANEOUS at 07:55

## 2022-07-20 RX ADMIN — HYDROMORPHONE HYDROCHLORIDE 0.5 MG: 1 INJECTION, SOLUTION INTRAMUSCULAR; INTRAVENOUS; SUBCUTANEOUS at 13:09

## 2022-07-20 RX ADMIN — PIPERACILLIN AND TAZOBACTAM 3.38 G: 3; .375 INJECTION, POWDER, LYOPHILIZED, FOR SOLUTION INTRAVENOUS at 04:54

## 2022-07-20 NOTE — PROGRESS NOTES
SURGERY PROGRESS NOTE      Admit Date: 2022    POD 3 Days Post-Op    Procedure: Procedure(s):  LAPAROSCOPIC REPAIR OF DUODENAL ULCER      Subjective:     Patient wants to go home ans she wants to eat. She states pain is controlled.   She is passing flatus and having BMs     Objective:     Visit Vitals  /83   Pulse 90   Temp 98.1 °F (36.7 °C)   Resp 18   Ht 5' 4\" (1.626 m)   Wt 81.6 kg (180 lb)   SpO2 96%   BMI 30.90 kg/m²        Temp (24hrs), Av.8 °F (36.6 °C), Min:97.4 °F (36.3 °C), Max:98.1 °F (36.7 °C)      701 - 1900  In: -   Out: 510 [Urine:500; Drains:10]  1901 -  07  In: -   Out: 7932 [Urine:2200; Drains:120]    Physical Exam:    General:  alert, cooperative, no distress, appears stated age   Abdomen: soft, bowel sounds active, non-tender, drain is serosanguinous   Incision:   healing well, no drainage, no erythema, no hernia, no seroma, no swelling, no dehiscence, incision well approximated           Lab Results   Component Value Date/Time    WBC 15.8 (H) 2022 02:02 AM    HGB 11.9 2022 02:02 AM    HCT 37.1 2022 02:02 AM    PLATELET 954  02:02 AM    MCV 88.5 2022 02:02 AM     Lab Results   Component Value Date/Time    GFR est non-AA >60 2022 02:02 AM    GFR est AA >60 2022 02:02 AM    Creatinine 0.46 (L) 2022 02:02 AM    BUN 8 2022 02:02 AM    Sodium 138 2022 02:02 AM    Potassium 4.1 2022 02:02 AM    Chloride 104 2022 02:02 AM    CO2 28 2022 02:02 AM       Assessment:     Active Problems:    Pneumoperitoneum (2022)    Doing well    Plan:     1) advance to sips and chips  2) UGI in am

## 2022-07-20 NOTE — OP NOTES
[address]  OPERATIVE REPORT    Name:  Yolande Livingston  MR#:  [id number]  :  1975  ACCOUNT #:  [de-identified]  DATE OF SERVICE:  2022    PREOPERATIVE DIAGNOSES:  1. Pneumoperitoneum. 2.  Abdominal pain. POSTOPERATIVE DIAGNOSIS:  Perforated duodenal ulcer. PROCEDURE PERFORMED:  Laparoscopic repair of duodenal ulcer with Saige Song patch. SURGEON:  Fabricio Granado MD    ASSISTANT:  Trinity Jarvis. ANESTHESIA:  General endotracheal.    COMPLICATIONS:  None. SPECIMENS REMOVED:  Anaerobic and aerobic culture of peritoneal fluid. IMPLANTS:  None. ESTIMATED BLOOD LOSS:  50 mL. DRAINS:  A 19-Nepali round Kunal-Maldonado drain over the repair. FINDINGS:  1.  Murky fluid in the abdomen. 2.  Perforation on the anterior surface of the first portion of the duodenum. 3.  Perforation repaired primarily and then reinforced with a Saige Song patch. INDICATIONS FOR PROCEDURE:  The patient is a 29-year-old female who presents to the emergency department with right upper quadrant pain radiating to her right shoulder. Abdomen and pelvis CT demonstrated free intraperitoneal air mostly localized in the right upper quadrant and ascites, so the patient is taken to the operating room for exploration. DESCRIPTION OF PROCEDURE:  The patient was identified in the preoperative holding area, informed consent obtained. She was given preoperative antibiotics. She was then taken to the operating room and placed in supine position, underwent general endotracheal anesthesia without complication. A Granados catheter was then inserted under sterile technique. A nasogastric tube was then inserted by Anesthesia. The patient's abdomen was then prepped and draped in the usual sterile fashion. A time-out was preformed to confirm the patient by name and that she was presenting for laparoscopic exploration and repair of hollow viscus perforation.   Once this was confirmed, entry into abdomen was obtained in the right anterior axillary line alf between her umbilicus and her anterior superior iliac spine. At this location, 3 mL of 0.5% Marcaine was injected in subcutaneous space. A 5-mm incision made and a 5-mm optical trocar containing a 5 mm 0-degree laparoscope was used to dissect the layers of the abdominal wall under direct laparoscopic vision. Entry into the abdomen was confirmed visually. Once within the abdomen, insufflation was provided through this trocar to a pressure of 15 mmHg. The patient tolerated insufflation well and there were no apparent complications. A 360-degree evaluation of the abdomen was then performed from this trocar site. The patient was found to have milky brown gray fluid throughout her abdomen. Her visceral and parietal peritoneum was injected and edematous. No clear source was identified on initial visual inspection. Attention was then focused in the area umbilicus and a second 5-mm trocar was placed under direct laparoscopic vision just above and just left to the patient's umbilicus. The camera was fixed to this trocar site and attention focused in the area of the right lobe of the liver. A blunt grasper was inserted through the initial trocar and the right lobe of the liver elevated towards the anterior abdominal wall. Upon elevating the lobe of the liver. Clear perforation was identified on the anterior wall of the first portion of the duodenum. It appeared to be a round punched out like lesion that was leaking bilious fluid. Once it was confirmed that it was a perforated duodenal ulcer, attention was focused in the right lower quadrant and an 8-mm trocar was placed between the initial trocar and the second trocar, this trocar was placed under direct laparoscopic vision as well. The patient was placed in slight reverse Trendelenburg. A 2-0 barbed suture was then inserted through the 8-mm trocar.   The barbed suture was attached to the anterior abdominal wall in the right upper quadrant. It was then passed under the right and left lobes of the liver. It was then passed through the posterior rectus sheath and peritoneum in the left upper quadrant, passed back under in a near triangular fashion and then sutured to the right lateral abdominal wall near the costal margin. The needle was then removed through the 8-mm trocar site. This barbed suture served as a liver retractor to expose the area of perforation. Once this was completed, a 2-0 silk suture was inserted into the abdomen and of the perforation was closed with three interrupted full-thickness simple sutures. Once the perforation was closed, the contamination in the right upper quadrant was aspirated for both anaerobic and aerobic bacteria and then suction irrigation was used to remove the bulk of the contamination. Next, the right upper quadrant was irrigated with a liter of normal saline and then this was aspirated to reduce any infectious material.  Healthy omentum was then grasped and brought up and placed over the repair. U sutures were then placed above the repair and to the left and the right of the repair. These were placed by passing 2-0 silk through the omentum, through seromuscular layers of the duodenum and then back out through the omentum tying the knot on top of the omentum loosely to secure the  omentum over the repair. Once this was completed, the right upper quadrant was then irrigated with 500 mL of Irrisept. While the Irrisept dwelled in the right upper quadrant, a 19-Luxembourgish Saad drain was inserted and brought out through the right most trocar site. The drain was placed over the omental buttress of the repair. Once the drain was in place, the barbed suture was cut at the three locations where it was secured to the abdominal wall and then the loose pieces were then removed, allowing the liver fall back down over the duodenum. The Irrisept irrigation solution was then aspirated. The abdomen was then diluted with another liter of normal saline and then this saline was removed. Insufflation was vented through the trocars and then they were removed. Skin at all trocar sites closed with 4-0 Monocryl and Dermabond. The patient was extubated in the room and taken to the postanesthesia care unit in stable condition. Instrument and sponge counts were correct x2.       Za Constantino MD      [id number]  D:  07/19/2022 20:03  T:  07/19/2022 22:58  JOB #:  [id number]

## 2022-07-20 NOTE — PROGRESS NOTES
CM attempted to complete initial assessment with patient, however due to strict isolation CM unable to make room visit. CM attempted to contact pt via bedphone x2, no answer. CM attempted to contact mobile number for patient on file and it is out of service. The same number listed as pt's mobile number is listed as daughter's contact number. CM attempted to contact pt son and his number on file is not working at this time either.      Howie Newman [facility], [address] \[facility] [place] [facility]\""

## 2022-07-20 NOTE — PROGRESS NOTES
End of Shift Note    Bedside shift change report given to Norma Monroe (oncoming nurse) by Julio C Ac RN (offgoing nurse). Report included the following information SBAR, Kardex, MAR, and Recent Results    Shift worked:  7p-7a     Shift summary and any significant changes:     Patient rested this shift  Patient noted pain this shift; medication administered-- see MAR       Concerns for physician to address: Additional pain medication for moderate pain? Zone phone for oncoming shift:   [phone]       Activity:  Activity Level: Up with Assistance  Number times ambulated in hallways past shift: 0  Number of times OOB to chair past shift: 0    Cardiac:   Cardiac Monitoring: No      Cardiac Rhythm: Sinus Rhythm    Access:   Current line(s): PIV     Genitourinary:   Urinary status: voiding    Respiratory:   O2 Device: None (Room air)  Chronic home O2 use?: NO       GI:  Last Bowel Movement Date: 07/19/22  Current diet:  DIET NPO  Tolerating current diet: YES       Pain Management:   Patient states pain is manageable on current regimen: YES    Skin:  Luis Antonio Score: 18  Interventions: increase time out of bed    Patient Safety:  Fall Score:  Total Score: 2  Interventions: assistive device (walker, cane, etc), gripper socks, and pt to call before getting OOB  High Fall Risk: Yes    Length of Stay:  Expected LOS: - - -  Actual LOS: 3      Julio C Ac RN

## 2022-07-20 NOTE — DIABETES MGMT
[address] [facility]    CLINICAL NURSE SPECIALIST CONSULT     Initial Presentation   Nasrin Almond Siemens is a 49 y.o. female admitted 7/19/22 with abdominal pain and SOB. The patient was recently admitted for  Covid symptoms (7/9/22) after testing positive from a home test. Admission glucose 294. Creatine 0.78. GFR >60. A1c 6.8%      HX: History reviewed. No pertinent past medical history. INITIAL DX:   Pneumoperitoneum [K66.8]     Current Treatment     TX: Correction insulin. . Protonix. Zosyn. Consulted by Provider for advanced diabetes nursing assessment and care for:   [] Transitioning off Chales Kiang   [x] Inpatient management strategy  [x] Home management assessment  [] Survival skill education    Hospital Course   Clinical progress has been complicated by Covid infection. Diabetes History   There is no hx of diabetes on file. Diabetes Medication History  Key Antihyperglycemic Medications       Patient is on no antihyperglycemic meds. Subjective   Did not enter room due to Covid precautions. Clinician and visitors in with patient. Patient did not answer phone. Cell phone number on file for  , is no longer in service.       Objective   Physical exam  General Obese female; ill-appearing ( patient viewed throw glass window)  Neuro  Alert, oriented   Vital Signs   Visit Vitals  /83   Pulse 90   Temp 98.1 °F (36.7 °C)   Resp 18   Ht 5' 4\" (1.626 m)   Wt 81.6 kg (180 lb)   SpO2 96%   BMI 30.90 kg/m²       Laboratory  Recent Labs     07/20/22  0202 07/19/22  0411 07/18/22  0243   * 153* 210*   AGAP 6 5 6   WBC 15.8* 20.5* 26.4*   CREA 0.46* 0.55 0.50*   GFRNA >60 >60 >60         Factors impacting BG management  Factor Dose Comments   Nutrition:  Standard meals     NPO    Pain Dilaudid PRN Taking   Infection Zosyn 3.375 Q 8 hours Covid    Other:   Kidney function     Normal      Blood glucose pattern      Significant diabetes-related events over the past 24-72 hours      Assessment and Plan   Nursing Diagnosis Risk for unstable blood glucose pattern   Nursing Intervention Domain 3441 Decision-making Support   Nursing Interventions Examined current inpatient diabetes/blood glucose control   Explored factors facilitating and impeding inpatient management  Explored corrective strategies with patient and responsible inpatient provider   Informed patient of rational for insulin strategy while hospitalized     Evaluation   There is no hx of diabetes on file in the medical health record for this 49year old female patient. The patient had a recent admission 7/9/22, and was not discharged on any anti glycemic medications. The patient has a current A1c of 6.8% and an admission glucose of 294. Although this meets the ADA requirements for a positive dx of diabetes, the patient has not required anti glycemic medication. She received a dose of dexamethasone 2 days ago. Her morning BG was 153 today. I recommend that the patient follow-up with her PCP, to discuss her A1c. I do not suspect the patient will require insulin at this time. Diabetes management will monitor BG's today and if BG's remain stable we will sign off in the morning.   7/20/22 BG's remain stable on no insulin. I suspect the patient will not require insulin or other anti glycemic medication. Diabetes management will sign off. Please feel free to order new consult if our services are needed. Diabetes management signing off.           MIRANDA Chaudhari  Diabetes Clinical Nurse Specialist  [facility]  Access via [employer]

## 2022-07-20 NOTE — PROGRESS NOTES
End of Shift Note    Bedside shift change report given to Deloris Pineda (oncoming nurse) by June Charlton LPN (offgoing nurse). Report included the following information SBAR, Kardex, ED Summary, Procedure Summary, Intake/Output, MAR, and Recent Results    Shift worked:  7am-7pm     Shift summary and any significant changes:     Plan of care, ON Respiratory isolation for COVID. Patient reports continued pain in abdomen, patient taking IV dilaudid . Concerns for physician to address:  Continued plan of care     Zone phone for oncoming shift:   [phone]       Activity:  Activity Level: Up with Assistance  Number times ambulated in hallways past shift: 0  Number of times OOB to chair past shift: 1    Cardiac:   Cardiac Monitoring: No      Cardiac Rhythm: Sinus Rhythm    Access:   Current line(s): PIV     Genitourinary:   Urinary status: voiding    Respiratory:   O2 Device: None (Room air), Nasal cannula  Chronic home O2 use?: NO  Incentive spirometer at bedside: YES       GI:  Last Bowel Movement Date: 07/19/22  Current diet:  DIET NPO  Passing flatus: YES  Tolerating current diet: NPO       Pain Management:   Patient states pain is manageable on current regimen: YES    Skin:  Luis Antonio Score: 18  Interventions: float heels, increase time out of bed, and nutritional support     Patient Safety:  Fall Score:  Total Score: 3  Interventions: gripper socks, pt to call before getting OOB, and stay with me (per policy)  High Fall Risk: Yes    Length of Stay:  Expected LOS: - - -  Actual LOS: [address], LPN

## 2022-07-21 ENCOUNTER — HOSPITAL ENCOUNTER (INPATIENT)
Dept: GENERAL RADIOLOGY | Age: 47
Discharge: HOME | DRG: 329 | End: 2022-07-21
Attending: SURGERY
Payer: COMMERCIAL

## 2022-07-21 LAB
BACTERIA SPEC CULT: NORMAL
GLUCOSE BLD STRIP.AUTO-MCNC: 118 MG/DL (ref 65–117)
GLUCOSE BLD STRIP.AUTO-MCNC: 144 MG/DL (ref 65–117)
GLUCOSE BLD STRIP.AUTO-MCNC: 149 MG/DL (ref 65–117)
GLUCOSE BLD STRIP.AUTO-MCNC: 173 MG/DL (ref 65–117)
GLUCOSE BLD STRIP.AUTO-MCNC: 98 MG/DL (ref 65–117)
GRAM STN SPEC: NORMAL
GRAM STN SPEC: NORMAL
SERVICE CMNT-IMP: ABNORMAL
SERVICE CMNT-IMP: NORMAL

## 2022-07-21 PROCEDURE — 74011000250 HC RX REV CODE- 250: Performed by: SURGERY

## 2022-07-21 PROCEDURE — 74240 X-RAY XM UPR GI TRC 1CNTRST: CPT

## 2022-07-21 PROCEDURE — 65270000029 HC RM PRIVATE

## 2022-07-21 PROCEDURE — 74011000636 HC RX REV CODE- 636: Performed by: RADIOLOGY

## 2022-07-21 PROCEDURE — 94760 N-INVAS EAR/PLS OXIMETRY 1: CPT

## 2022-07-21 PROCEDURE — 82962 GLUCOSE BLOOD TEST: CPT

## 2022-07-21 PROCEDURE — 74011250637 HC RX REV CODE- 250/637: Performed by: SURGERY

## 2022-07-21 PROCEDURE — 74011250636 HC RX REV CODE- 250/636: Performed by: SURGERY

## 2022-07-21 PROCEDURE — C9113 INJ PANTOPRAZOLE SODIUM, VIA: HCPCS | Performed by: SURGERY

## 2022-07-21 PROCEDURE — 74011000258 HC RX REV CODE- 258: Performed by: SURGERY

## 2022-07-21 RX ORDER — INSULIN LISPRO 100 [IU]/ML
INJECTION, SOLUTION INTRAVENOUS; SUBCUTANEOUS
Status: DISCONTINUED | OUTPATIENT
Start: 2022-07-21 | End: 2022-07-22 | Stop reason: HOSPADM

## 2022-07-21 RX ORDER — OXYCODONE HYDROCHLORIDE 5 MG/1
5 TABLET ORAL
Status: DISCONTINUED | OUTPATIENT
Start: 2022-07-21 | End: 2022-07-22 | Stop reason: HOSPADM

## 2022-07-21 RX ORDER — MAGNESIUM SULFATE 100 %
4 CRYSTALS MISCELLANEOUS AS NEEDED
Status: DISCONTINUED | OUTPATIENT
Start: 2022-07-21 | End: 2022-07-22 | Stop reason: HOSPADM

## 2022-07-21 RX ORDER — PANTOPRAZOLE SODIUM 40 MG/1
40 TABLET, DELAYED RELEASE ORAL
Status: DISCONTINUED | OUTPATIENT
Start: 2022-07-21 | End: 2022-07-22 | Stop reason: HOSPADM

## 2022-07-21 RX ADMIN — ACETAMINOPHEN 650 MG: 325 TABLET ORAL at 12:18

## 2022-07-21 RX ADMIN — PIPERACILLIN AND TAZOBACTAM 3.38 G: 3; .375 INJECTION, POWDER, LYOPHILIZED, FOR SOLUTION INTRAVENOUS at 04:54

## 2022-07-21 RX ADMIN — PIPERACILLIN AND TAZOBACTAM 3.38 G: 3; .375 INJECTION, POWDER, LYOPHILIZED, FOR SOLUTION INTRAVENOUS at 12:18

## 2022-07-21 RX ADMIN — IOHEXOL 150 ML: 240 INJECTION, SOLUTION INTRATHECAL; INTRAVASCULAR; INTRAVENOUS; ORAL at 09:00

## 2022-07-21 RX ADMIN — SODIUM CHLORIDE, PRESERVATIVE FREE 40 MG: 5 INJECTION INTRAVENOUS at 09:27

## 2022-07-21 RX ADMIN — SODIUM CHLORIDE, PRESERVATIVE FREE 10 ML: 5 INJECTION INTRAVENOUS at 21:36

## 2022-07-21 RX ADMIN — ACETAMINOPHEN 650 MG: 325 TABLET ORAL at 05:09

## 2022-07-21 RX ADMIN — PIPERACILLIN AND TAZOBACTAM 3.38 G: 3; .375 INJECTION, POWDER, LYOPHILIZED, FOR SOLUTION INTRAVENOUS at 20:59

## 2022-07-21 RX ADMIN — POTASSIUM CHLORIDE AND SODIUM CHLORIDE: 900; 150 INJECTION, SOLUTION INTRAVENOUS at 09:28

## 2022-07-21 RX ADMIN — SODIUM CHLORIDE, PRESERVATIVE FREE 10 ML: 5 INJECTION INTRAVENOUS at 14:10

## 2022-07-21 RX ADMIN — PANTOPRAZOLE SODIUM 40 MG: 40 TABLET, DELAYED RELEASE ORAL at 18:43

## 2022-07-21 NOTE — PROGRESS NOTES
Spiritual Care Assessment/Progress Note  [facility]      NAME: Katrinka Lennox      MRN: [id number]  AGE: 49 y.o. SEX: female  Holiness Affiliation: Yazdanism   Language: English     7/21/2022     Total Time (in minutes): 13     Spiritual Assessment begun in [facility] 3 SURG TELE through conversation with:         []Patient        [] Family    [] Friend(s)        Reason for Consult: Initial/Spiritual assessment, patient floor     Spiritual beliefs: (Please include comment if needed)     [] Identifies with a kiley tradition:         [] Supported by a kiley community:            [] Claims no spiritual orientation:           [] Seeking spiritual identity:                [] Adheres to an individual form of spirituality:           [x] Not able to assess:                           Identified resources for coping:      [] Prayer                               [] Music                  [] Guided Imagery     [] Family/friends                 [] Pet visits     [] Devotional reading                         [x] Unknown     [] Other:                                                Interventions offered during this visit: (See comments for more details)    Patient Interventions: Initial visit, Other (comment) (consulted nurse)           Plan of Care:     [x] Support spiritual and/or cultural needs    [] Support AMD and/or advance care planning process      [] Support grieving process   [] Coordinate Rites and/or Rituals    [] Coordination with community clergy   [] No spiritual needs identified at this time   [] Detailed Plan of Care below (See Comments)  [] Make referral to Music Therapy  [] Make referral to Pet Therapy     [] Make referral to Addiction services  [] Make referral to [facility]  [] Make referral to Spiritual Care Partner  [] No future visits requested        [x] Contact Spiritual Care for further referrals     Comments:   Reviewed chart prior to visit on Surg Tele for spiritual assessment.   Patient is on COVID+ isolation precautions;  attempted calling into room, but patient did not answer. Consulted with patient's nurse, [facility], RN. She indicated patient's daughter works here and stops in a couple times during the day. Unable to complete spiritual assessment.    available upon referral by staff or by patient/family request.       DOLORES [place], [facility], Staff   ENRIQUE [facility] Paging Service  [phone])

## 2022-07-21 NOTE — PROGRESS NOTES
End of Shift Note    Bedside shift change report given to Ayde Mccord (oncoming nurse) by Arjun Mack RN (offgoing nurse). Report included the following information SBAR, Kardex, Intake/Output, MAR, and Recent Results    Shift worked:  7a-7p     Shift summary and any significant changes:     KUB this AM, Sugar checks changed to ACHS, diet advanced to full liquid, oxy added for moderate pain, liquid BM today, tylenol given once for pain     Concerns for physician to address:  none     Zone phone for oncoming shift:   [phone]       Activity:  Activity Level: Up with Assistance  Number times ambulated in hallways past shift: 0  Number of times OOB to chair past shift: 3    Cardiac:   Cardiac Monitoring: No      Cardiac Rhythm: Sinus Rhythm    Access:   Current line(s): PIV     Genitourinary:   Urinary status: voiding    Respiratory:   O2 Device: None (Room air)  Chronic home O2 use?: NO  Incentive spirometer at bedside: YES       GI:  Last Bowel Movement Date: 07/21/22  Current diet:  ADULT DIET Full Liquid  Passing flatus: YES  Tolerating current diet: YES       Pain Management:   Patient states pain is manageable on current regimen: YES    Skin:  Luis Antonio Score: 19  Interventions: increase time out of bed    Patient Safety:  Fall Score:  Total Score: 3  Interventions: gripper socks, pt to call before getting OOB, and stay with me (per policy)  High Fall Risk: Yes    Length of Stay:  Expected LOS: 10d 7h  Actual LOS: 4      Arjun Mack, RN

## 2022-07-21 NOTE — PROGRESS NOTES
Transition of Care Plan:    RUR:  8%   Disposition:  home with family  Follow up appointments: need a new pt appt  DME needed:  none   Transportation at Discharge: daughter  [address] or means to access home:      yes   Medicare Letter:n/a  Is patient a BCPI-A Bundle:   n/a        If yes, was Bundle Letter given?:    Is patient a [place] and connected with the [place]? N/a          If yes, was [employer] transfer form completed and VA notified? Caregiver Contact: Deanna Calvin [phone]  Discharge Caregiver contacted prior to discharge? CM will call if pt desires at d/c. Care Conference needed?:        Reason for Admission:  Pneumoperitoneum                     RUR Score:          8%            Plan for utilizing home health:      as recommended    PCP: First and Last name:  None     Name of Practice:    Are you a current patient: Yes/No:    Approximate date of last visit:    Can you participate in a virtual visit with your PCP:                     Current Advanced Directive/Advance Care Plan: Full Code    Advance Care Planning     General Advance Care Planning (ACP) Conversation      Date of Conversation: 7/21/22  Conducted with: Patient with Decision Making Capacity    Healthcare Decision Maker:   No healthcare decision makers have been documented. Click here to complete [place] including selection of the Healthcare Decision Maker Relationship (ie \"Primary\")    Content/Action Overview:   DECLINED ACP conversation - will revisit periodically   Reviewed DNR/DNI and patient elects Full Code (Attempt Resuscitation)      Length of Voluntary ACP Conversation in minutes:  <16 minutes (Non-Billable)    Katharine Sheridan                               Transition of Care Plan:   home with family      CM spoke with pt via phone to introduce self/role, verify demographics, insurance and PCP. CM also discussed d/c plan.         Pt is a 50 yo, ,  female who was admitted to [address] [place] [address] on 7/17/22 with the above dx. Pt needs a new PCP appt at d/c and is agreeable to us scheduling one for her. Pt obtains medications from "[place]" on [address] [facility]. Pt lives with her spouse, daughter and TRINO in a 2 fl home with 4 LIZZ. Pt can complete her ADLs/IADLs independently at baseline. Pt does not drive. Pt does not use any DME. Pt denied a hx of HH, SNF or IPR. Pt's daughter will transport at d/c. CM will continue to assess for d/c needs. Care Management Interventions  PCP Verified by CM: Yes (Pt needs a new PCP.)  Mode of Transport at Discharge:  Other (see comment) (daughter)  Transition of Care Consult (CM Consult): Discharge Planning  Discharge Durable Medical Equipment: No  Physical Therapy Consult: No  Occupational Therapy Consult: No  Speech Therapy Consult: No  Support Systems: Child(eduardo), Spouse/Significant Other  Confirm Follow Up Transport: Family  The Patient and/or Patient Representative was Provided with a Choice of Provider and Agrees with the Discharge Plan?: Yes  Freedom of Choice List was Provided with Basic Dialogue that Supports the Patient's Individualized Plan of Care/Goals, Treatment Preferences and Shares the Quality Data Associated with the Providers?: Yes  Discharge Location  Patient Expects to be Discharged to[de-identified] Home with family assistance    Yuly Franklin, WILL  Care Management, [address]

## 2022-07-21 NOTE — PROGRESS NOTES
SURGERY PROGRESS NOTE      Admit Date: 2022    POD 4 Days Post-Op    Procedure: Procedure(s):  LAPAROSCOPIC REPAIR OF DUODENAL ULCER      Subjective:     Patient feels better today. More energy. No pain. Wants to eat. Tolerating sips and chips.        Objective:     Visit Vitals  /67 (BP 1 Location: Right upper arm, BP Patient Position: At rest;Semi fowlers)   Pulse 70   Temp 98.1 °F (36.7 °C)   Resp 16   Ht 5' 4\" (1.626 m)   Wt 81.6 kg (180 lb)   SpO2 95%   BMI 30.90 kg/m²        Temp (24hrs), Av.2 °F (36.8 °C), Min:97.8 °F (36.6 °C), Max:98.9 °F (37.2 °C)      701 - 1900  In: -   Out: 320 [Urine:300; Drains:20]  1901 - 700  In: 218 [P.O.:218]  Out: 830 [Urine:800; Drains:30]    Physical Exam:    General:  alert, cooperative, no distress, appears stated age   Abdomen: soft, bowel sounds active, non-tender    KEY 20cc/24 hours serous   Incision:   healing well, no drainage, no erythema, no hernia, no seroma, no swelling, no dehiscence, incision well approximated           Lab Results   Component Value Date/Time    WBC 15.8 (H) 2022 02:02 AM    HGB 11.9 2022 02:02 AM    HCT 37.1 2022 02:02 AM    PLATELET 631  02:02 AM    MCV 88.5 2022 02:02 AM     Lab Results   Component Value Date/Time    GFR est non-AA >60 2022 02:02 AM    GFR est AA >60 2022 02:02 AM    Creatinine 0.46 (L) 2022 02:02 AM    BUN 8 2022 02:02 AM    Sodium 138 2022 02:02 AM    Potassium 4.1 2022 02:02 AM    Chloride 104 2022 02:02 AM    CO2 28 2022 02:02 AM       Assessment:     Active Problems:    Pneumoperitoneum (2022)      Doing well  Plan:       UGI today to assess repair   If ok full liquids  D/C planning for tomorrow if UGI ok

## 2022-07-21 NOTE — PROGRESS NOTES
End of Shift Note    Bedside shift change report given to [facility] RN (oncoming nurse) by Sarah Villafana RN (offgoing nurse). Report included the following information SBAR, Kardex, Intake/Output, and MAR    Shift worked:  7p-7a     Shift summary and any significant changes:     Patient noted some pain this shift; medication administered-- see MAR    Patient rested this shift     Concerns for physician to address:  Oral medication for moderate pain control? Zone phone for oncoming shift:   [phone]       Activity:  Activity Level: Up with Assistance  Number times ambulated in hallways past shift: 0  Number of times OOB to chair past shift: 0    Cardiac:   Cardiac Monitoring: No      Cardiac Rhythm: Sinus Rhythm    Access:   Current line(s): PIV     Genitourinary:   Urinary status: voiding    Respiratory:   O2 Device: None (Room air), Nasal cannula  Chronic home O2 use? NO    GI:  Last Bowel Movement Date: 07/19/22  Current diet:  DIET NPO Ice Chips, Sips of Water with Meds, Sips of Clear Liquids, Popsicles  Tolerating current diet: YES       Pain Management:   Patient states pain is manageable on current regimen: YES    Skin:  Luis Antonio Score: 19  Interventions: turn team and increase time out of bed    Patient Safety:  Fall Score:  Total Score: 3  Interventions: assistive device (walker, cane, etc), gripper socks, and pt to call before getting OOB  High Fall Risk: Yes    Length of Stay:  Expected LOS: 10d 7h  Actual LOS: 4      Sarah Villafana RN

## 2022-07-22 ENCOUNTER — TELEPHONE (OUTPATIENT)
Dept: SURGERY | Age: 47
End: 2022-07-22

## 2022-07-22 VITALS
BODY MASS INDEX: 30.73 KG/M2 | HEIGHT: 64 IN | WEIGHT: 180 LBS | DIASTOLIC BLOOD PRESSURE: 62 MMHG | TEMPERATURE: 98.6 F | OXYGEN SATURATION: 97 % | SYSTOLIC BLOOD PRESSURE: 104 MMHG | HEART RATE: 83 BPM | RESPIRATION RATE: 17 BRPM

## 2022-07-22 LAB
BACTERIA SPEC CULT: NORMAL
GLUCOSE BLD STRIP.AUTO-MCNC: 106 MG/DL (ref 65–117)
GLUCOSE BLD STRIP.AUTO-MCNC: 195 MG/DL (ref 65–117)
SERVICE CMNT-IMP: ABNORMAL
SERVICE CMNT-IMP: NORMAL
SERVICE CMNT-IMP: NORMAL

## 2022-07-22 PROCEDURE — 74011250637 HC RX REV CODE- 250/637: Performed by: SURGERY

## 2022-07-22 PROCEDURE — 74011250636 HC RX REV CODE- 250/636: Performed by: SURGERY

## 2022-07-22 PROCEDURE — 74011636637 HC RX REV CODE- 636/637: Performed by: SURGERY

## 2022-07-22 PROCEDURE — 74011000258 HC RX REV CODE- 258: Performed by: SURGERY

## 2022-07-22 PROCEDURE — 82962 GLUCOSE BLOOD TEST: CPT

## 2022-07-22 PROCEDURE — 74011000250 HC RX REV CODE- 250: Performed by: SURGERY

## 2022-07-22 RX ORDER — OXYCODONE HYDROCHLORIDE 5 MG/1
5 TABLET ORAL
Qty: 12 TABLET | Refills: 0 | Status: SHIPPED | OUTPATIENT
Start: 2022-07-22 | End: 2022-07-25

## 2022-07-22 RX ORDER — PANTOPRAZOLE SODIUM 20 MG/1
20 TABLET, DELAYED RELEASE ORAL DAILY
Qty: 30 TABLET | Refills: 0 | Status: SHIPPED | OUTPATIENT
Start: 2022-07-22 | End: 2022-07-28 | Stop reason: SDUPTHER

## 2022-07-22 RX ADMIN — OXYCODONE 5 MG: 5 TABLET ORAL at 01:32

## 2022-07-22 RX ADMIN — PIPERACILLIN AND TAZOBACTAM 3.38 G: 3; .375 INJECTION, POWDER, LYOPHILIZED, FOR SOLUTION INTRAVENOUS at 11:51

## 2022-07-22 RX ADMIN — SODIUM CHLORIDE, PRESERVATIVE FREE 10 ML: 5 INJECTION INTRAVENOUS at 11:54

## 2022-07-22 RX ADMIN — SODIUM CHLORIDE, PRESERVATIVE FREE 10 ML: 5 INJECTION INTRAVENOUS at 06:42

## 2022-07-22 RX ADMIN — PIPERACILLIN AND TAZOBACTAM 3.38 G: 3; .375 INJECTION, POWDER, LYOPHILIZED, FOR SOLUTION INTRAVENOUS at 04:00

## 2022-07-22 RX ADMIN — PANTOPRAZOLE SODIUM 40 MG: 40 TABLET, DELAYED RELEASE ORAL at 17:43

## 2022-07-22 RX ADMIN — PANTOPRAZOLE SODIUM 40 MG: 40 TABLET, DELAYED RELEASE ORAL at 06:42

## 2022-07-22 RX ADMIN — POTASSIUM CHLORIDE AND SODIUM CHLORIDE: 900; 150 INJECTION, SOLUTION INTRAVENOUS at 01:41

## 2022-07-22 RX ADMIN — Medication 2 UNITS: at 12:38

## 2022-07-22 NOTE — PROGRESS NOTES
Pt is cleared for d/c from a CM standpoint. Transition of Care Plan:     RUR:  8%  Disposition:  home with family  Follow up appointments: need a new pt appt  DME needed:  none   Transportation at Discharge: daughter  [address] or means to access home:      yes  IM Medicare Letter:n/a  Is patient a BCPI-A Bundle:   n/a                   If yes, was Bundle Letter given?:    Is patient a [place] and connected with the [place]? N/a          If yes, was [employer] transfer form completed and VA notified? Caregiver Contact: Cara Starr [phone]  Discharge Caregiver contacted prior to discharge? CM will call if pt desires at d/c. Care Conference needed?:        3:51 p.m. CM attempted to call pt's room x2 with no answer. CM attempted to call pt's daughter and number is not working. 3:50 p.m. CM made follow up appts. 3:18 p.m. CM informed by pt's nurse, Radha Medina, that pt is going to be d/c'd later today and that her daughter will transport her home. CM called Dr. Jo Chimera office and left message that pt's daughter would like an update. CM left her contact info. Care Management Interventions  PCP Verified by CM: Yes (Pt needs a new PCP.)  Mode of Transport at Discharge: Other (see comment) (daughter)  Transition of Care Consult (CM Consult):  Other (home with family)  Discharge Durable Medical Equipment: No  Physical Therapy Consult: No  Occupational Therapy Consult: No  Speech Therapy Consult: No  Support Systems: Child(eduardo), Spouse/Significant Other  Confirm Follow Up Transport: Family  The Patient and/or Patient Representative was Provided with a Choice of Provider and Agrees with the Discharge Plan?: Yes  Freedom of Choice List was Provided with Basic Dialogue that Supports the Patient's Individualized Plan of Care/Goals, Treatment Preferences and Shares the Quality Data Associated with the Providers?: Yes  Discharge Location  Patient Expects to be Discharged to[de-identified] Home with family assistance      Deysi Queen MS MariyaW  Care Management, [address]

## 2022-07-22 NOTE — PROGRESS NOTES
DISCHARGE NOTE FROM [facility] NURSE    Patient determined to be stable for discharge by attending provider. I have reviewed the discharge instructions and follow-up appointments with the patient, daughter, and son. They verbalized understanding and all questions were answered to their satisfaction. No complaints or further questions were expressed. Medications sent to pharmacy. Appropriate educational materials and medication side effect teaching were provided. PIV were removed prior to discharge. Patient did not discharge with any line, mak, or drain. All personal items collected during admission were returned to the patient prior to discharge. Post-op patient: Yes- Patient given post-op discharge kit and instructed on use.        General Juarez, DANITZA

## 2022-07-22 NOTE — PROGRESS NOTES
Problem: Falls - Risk of  Goal: *Absence of Falls  Description: Document Naeem Sol Fall Risk and appropriate interventions in the flowsheet. Outcome: Progressing Towards Goal  Note: Fall Risk Interventions:  Mobility Interventions: Patient to call before getting OOB         Medication Interventions: Patient to call before getting OOB    Elimination Interventions: Call light in reach              Problem: Patient Education: Go to Patient Education Activity  Goal: Patient/Family Education  Outcome: Progressing Towards Goal     Problem: Surgical Pathway Day of Surgery  Goal: Consults, if ordered  Outcome: Progressing Towards Goal  Goal: Nutrition/Diet  Outcome: Progressing Towards Goal  Goal: Respiratory  Outcome: Progressing Towards Goal  Goal: Treatments/Interventions/Procedures  Outcome: Progressing Towards Goal  Goal: Psychosocial  Outcome: Progressing Towards Goal     Problem: Pain  Goal: *Control of Pain  Outcome: Progressing Towards Goal     Problem: Diabetes Self-Management  Goal: *Disease process and treatment process  Description: Define diabetes and identify own type of diabetes; list 3 options for treating diabetes. Outcome: Progressing Towards Goal  Goal: *Incorporating nutritional management into lifestyle  Description: Describe effect of type, amount and timing of food on blood glucose; list 3 methods for planning meals. Outcome: Progressing Towards Goal  Goal: *Incorporating physical activity into lifestyle  Description: State effect of exercise on blood glucose levels. Outcome: Progressing Towards Goal  Goal: *Developing strategies to promote health/change behavior  Description: Define the ABC's of diabetes; identify appropriate screenings, schedule and personal plan for screenings. Outcome: Progressing Towards Goal  Goal: *Using medications safely  Description: State effect of diabetes medications on diabetes; name diabetes medication taking, action and side effects.   Outcome: Progressing Towards Goal  Goal: *Monitoring blood glucose, interpreting and using results  Description: Identify recommended blood glucose targets  and personal targets. Outcome: Progressing Towards Goal  Goal: *Prevention, detection, treatment of acute complications  Description: List symptoms of hyper- and hypoglycemia; describe how to treat low blood sugar and actions for lowering  high blood glucose level. Outcome: Progressing Towards Goal  Goal: *Prevention, detection and treatment of chronic complications  Description: Define the natural course of diabetes and describe the relationship of blood glucose levels to long term complications of diabetes. Outcome: Progressing Towards Goal  Goal: *Developing strategies to address psychosocial issues  Description: Describe feelings about living with diabetes; identify support needed and support network  Outcome: Progressing Towards Goal  Goal: *Insulin pump training  Outcome: Progressing Towards Goal  Goal: *Sick day guidelines  Outcome: Progressing Towards Goal  Goal: *Patient Specific Goal (EDIT GOAL, INSERT TEXT)  Outcome: Progressing Towards Goal     Problem: Patient Education: Go to Patient Education Activity  Goal: Patient/Family Education  Outcome: Progressing Towards Goal     Problem: Airway Clearance - Ineffective  Goal: Achieve or maintain patent airway  Outcome: Progressing Towards Goal     Problem: Gas Exchange - Impaired  Goal: Absence of hypoxia  Outcome: Progressing Towards Goal  Goal: Promote optimal lung function  Outcome: Progressing Towards Goal     Problem: Breathing Pattern - Ineffective  Goal: Ability to achieve and maintain a regular respiratory rate  Outcome: Progressing Towards Goal     Problem:  Body Temperature -  Risk of, Imbalanced  Goal: Ability to maintain a body temperature within defined limits  Outcome: Progressing Towards Goal  Goal: Will regain or maintain usual level of consciousness  Outcome: Progressing Towards Goal  Goal: Complications related to the disease process, condition or treatment will be avoided or minimized  Outcome: Progressing Towards Goal     Problem: Isolation Precautions - Risk of Spread of Infection  Goal: Prevent transmission of infectious organism to others  Outcome: Progressing Towards Goal     Problem: Nutrition Deficits  Goal: Optimize nutrtional status  Outcome: Progressing Towards Goal     Problem: Risk for Fluid Volume Deficit  Goal: Maintain normal heart rhythm  Outcome: Progressing Towards Goal  Goal: Maintain absence of muscle cramping  Outcome: Progressing Towards Goal  Goal: Maintain normal serum potassium, sodium, calcium, phosphorus, and pH  Outcome: Progressing Towards Goal     Problem: Loneliness or Risk for Loneliness  Goal: Demonstrate positive use of time alone when socialization is not possible  Outcome: Progressing Towards Goal     Problem: Fatigue  Goal: Verbalize increase energy and improved vitality  Outcome: Progressing Towards Goal     Problem: Patient Education: Go to Patient Education Activity  Goal: Patient/Family Education  Outcome: Progressing Towards Goal

## 2022-07-22 NOTE — DISCHARGE SUMMARY
DISCHARGE SUMMARY      Patient ID:  Lulu Cox  [id number]  49 y.o.  1975    Admit date: 7/17/2022    Discharge date and time: No discharge date for patient encounter. Admitting Physician: Reilly Alvarez MD     Discharge Physician: Reilly Alvarez MD      Admission Diagnoses: Pneumoperitoneum [K66.8]    Discharge Diagnoses: Active Problems:    Pneumoperitoneum (7/17/2022)        Procedures: Procedure(s):  LAPAROSCOPIC REPAIR OF DUODENAL ULCER    Consults: general surgery        Hospital Course:   Patient admitted with sepsis and pneumoperitoneum. Found to have a perforated duodenal ulcer at surgery. She was placed on a PPI and antibiotics. She did well after surgery. UGI on POD#4 showed no leak or stricture. She tolerated a full liquid diet. Her drain remain serous and was removed at discharge. On discharge patient is without pain, ambulating, and tolerating a full liquid diet. She is to stay on a full liquid diet until seen in follow up next week. If doing well her diet will be advanced. D/C Disposition: home     Patient Instructions:   Current Discharge Medication List        START taking these medications    Details   oxyCODONE IR (ROXICODONE) 5 mg immediate release tablet Take 1 Tablet by mouth every six (6) hours as needed for Pain for up to 3 days. Max Daily Amount: 20 mg.  Qty: 12 Tablet, Refills: 0    Associated Diagnoses: Perforated viscus      pantoprazole (PROTONIX) 20 mg tablet Take 1 Tablet by mouth in the morning. Indications: an ulcer of the duodenum  Qty: 30 Tablet, Refills: 0           CONTINUE these medications which have NOT CHANGED    Details   nirmatrelvir-ritonavir (Paxlovid, EUA,) 150 mg x 2- 100 mg tablet Take as directed  Qty: 1 Box, Refills: 0      ondansetron (ZOFRAN ODT) 4 mg disintegrating tablet Take 1 Tablet by mouth every eight (8) hours as needed for Nausea or Vomiting.   Qty: 10 Tablet, Refills: 0      meclizine (ANTIVERT) 25 mg tablet Take 1 Tablet by mouth three (3) times daily as needed for Dizziness. Qty: 20 Tablet, Refills: 0      dexAMETHasone (DECADRON) 2 mg tablet Take 10mg on day 1, then 8mg on day 2/3, then 6mg on day 4/5, then 4mg on day 6/7, then 2mg x 1  Qty: 24 Tablet, Refills: 0      tenofovir ALAFENAMIDE FUMARATE (Vemlidy) 25 mg tablet Take 25 mg by mouth daily. Diet: full liquid diet for one week.   Then can advance to regular diet after being seen in the office next week    Activities: as tolerated     Follow-up with Acute care surgery next week        Signed:  Ryan Alfonso MD  7/22/2022  3:27 PM

## 2022-07-22 NOTE — DISCHARGE INSTRUCTIONS
Diet     For the first week, stay on a liquid diet. This includes broths, soups, milk shakes,   Have 5 or 6 small meals each day instead of 2 or 3 large meals. Diet Instructions     Full Liquid Diet (for the first week after surgery)   Principle: Anything you can pour you can have.   _ YES Items:    Jello    juice (Cranberry, Apple, Grape)    coffee    water    Popsicles    broth    ice    cream (strained) soups    pudding    ice cream    milk    yogurt    thinned oatmeal (or hot) cereal, Cream of Wheat, [place]    milk shakes    liquid meal suppliments like Ensure, [employer], etc.     NO Items:  sold foods,   Instructions:   1. Start by taking a cup at a time. 2. Increase the amount a little each day. 3. Dont gulp liquids down. 4. This diet should be strictly adhered to after surgery. Activity : As tolerated   Keep old drain site covered tonight, change dressing after taking a shower tomorrow and replaced with dry dressing until closed, once it is small, use band-aid until it is completely closed. Please call [phone] to make a follow up appointment with Max Arroyo or Abraham Geiger next week for evaluation and diet advancement      [facility] of [address] [facility], [address] [facility], [address] [place], [address] Brandee Cabral [address] [phone]  Fax [phone]

## 2022-07-22 NOTE — PROGRESS NOTES
Caity Taylor RNEnd of Shift Note    Bedside shift change report given to Trang Telles (oncoming nurse) by Lennox Bourbon, RN (offgoing nurse). Report included the following information SBAR, Kardex, MAR, and Recent Results    Shift worked:  7p-7a     Shift summary and any significant changes:     IV infiltrated LT forearm 20 g taken out tolerated well. Medicated x1 tolerated find. Concerns for physician to address:  none     Zone phone for oncoming shift:   [phone]       Activity:  Activity Level: Up with Assistance  Number times ambulated in hallways past shift: 0  Number of times OOB to chair past shift: 0    Cardiac:   Cardiac Monitoring: No      Cardiac Rhythm: Sinus Rhythm    Access:   Current line(s): HD access     Genitourinary:   Urinary status: voiding    Respiratory:   O2 Device: None (Room air)  Chronic home O2 use?: NO  Incentive spirometer at bedside: YES       GI:  Last Bowel Movement Date: 07/21/22  Current diet:  ADULT DIET Full Liquid  Passing flatus: YES  Tolerating current diet: YES       Pain Management:   Patient states pain is manageable on current regimen: YES    Skin:  Luis Antonio Score: 19  Interventions: float heels    Patient Safety:  Fall Score:  Total Score: 3  Interventions: gripper socks, pt to call before getting OOB, and stay with me (per policy)  High Fall Risk: Yes    Length of Stay:  Expected LOS: 10d 7h  Actual LOS: [address], RN

## 2022-07-27 NOTE — PROGRESS NOTES
SUBJECTIVE: Marti Stauffer is a 49 y.o. female is seen for a routine postop check. Reports no problems with the wound or other issues. Activity, diet and bowels are normal. No pain. Patient approximately 2 weeks status post laparoscopic duodenal ulcer repair by Dr. Flako Allen. Patient with some nonspecific normal postoperative abdominal incisional discomfort but reports eating well, good lower GI function no urinary symptoms. Patient is accompanied by her daughter. Patient appears to speak [place] well. She is somewhat fatigued but no other concerns. Patient was COVID-positive 10 days ago but by daughter's report is COVID-negative, picture shown of COVID-negative test.  No current COVID symptoms. Patient has been taking Protonix, apparently was on a steroid Dosepak for COVID symptoms I have recommended they stop this. Patient does not smoke has not been using much in the way of aspirin or nonsteroidal anti-inflammatory drugs unclear why she developed this duodenal ulcer. OBJECTIVE: Appears well. Neuro C A and O x3  CV-  Reg rate  Lungs:  CTA  Abd  soft  NT  Wounds are well healed without complications or infection. ASSESSMENT: normal postdischarge course, doing well. Patient is somewhat fatigued but this is normal, no dyspnea. PLAN: Patient is instructed to ad arti activity . Return PRN for any problems or concerns. Advised patient to follow-up with PCP in the next couple of weeks to arrange gastroenterology referral to consider upper GI endoscopy in several months to assess the rest of the gastric and duodenal area and consider Helicobacter testing to see if she needs to be treated for that.   Otherwise follow-up with surgery can be as needed and I have renewed prescription for proton pump inhibitor, so patient can take this for at least 2 and 3 months

## 2022-07-28 ENCOUNTER — OFFICE VISIT (OUTPATIENT)
Dept: SURGERY | Age: 47
End: 2022-07-28
Payer: COMMERCIAL

## 2022-07-28 VITALS
WEIGHT: 170 LBS | RESPIRATION RATE: 16 BRPM | SYSTOLIC BLOOD PRESSURE: 120 MMHG | OXYGEN SATURATION: 99 % | DIASTOLIC BLOOD PRESSURE: 82 MMHG | TEMPERATURE: 97.5 F | HEIGHT: 64 IN | BODY MASS INDEX: 29.02 KG/M2 | HEART RATE: 97 BPM

## 2022-07-28 DIAGNOSIS — Z09 POSTOPERATIVE EXAMINATION: Primary | ICD-10-CM

## 2022-07-28 PROCEDURE — 99024 POSTOP FOLLOW-UP VISIT: CPT | Performed by: SURGERY

## 2022-07-28 RX ORDER — PANTOPRAZOLE SODIUM 20 MG/1
20 TABLET, DELAYED RELEASE ORAL DAILY
Qty: 30 TABLET | Refills: 0 | Status: SHIPPED | OUTPATIENT
Start: 2022-07-28

## 2022-07-28 RX ORDER — PANTOPRAZOLE SODIUM 20 MG/1
20 TABLET, DELAYED RELEASE ORAL DAILY
Qty: 30 TABLET | Refills: 1 | Status: SHIPPED | OUTPATIENT
Start: 2022-07-28 | End: 2022-08-27

## 2022-07-28 NOTE — PROGRESS NOTES
Identified pt with two pt identifiers(name and ). Reviewed record in preparation for visit and have obtained necessary documentation. All patient medications has been reviewed. Chief Complaint   Patient presents with    Surgical Follow-up     LAPAROSCOPIC REPAIR OF DUODENAL ULCER (Abdomen) 22       Health Maintenance Due   Topic    Hepatitis C Screening     Depression Screen     COVID-19 Vaccine (1)    DTaP/Tdap/Td series (1 - Tdap)    Cervical cancer screen     Lipid Screen     Colorectal Cancer Screening Combo        Vitals:    22 1259   BP: 120/82   Pulse: 97   Resp: 16   Temp: 97.5 °F (36.4 °C)   TempSrc: Temporal   SpO2: 99%   Weight: 77.1 kg (170 lb)   Height: 5' 4\" (1.626 m)   PainSc:   3   PainLoc: Abdomen       4. Have you been to the ER, urgent care clinic since your last visit? Hospitalized since your last visit? No    5. Have you seen or consulted any other health care providers outside of the [address] [place] [address] since your last visit? Include any pap smears or colon screening. No      Patient is accompanied by daughter I have received verbal consent from Javi Shea to discuss any/all medical information while they are present in the room.

## 2023-06-19 LAB
ESTIMATED AVERAGE GLUCOSE: NORMAL
HBA1C MFR BLD: 6.9 %

## (undated) DEVICE — AEGIS 1" DISK 4MM HOLE, PEEL OPEN: Brand: MEDLINE

## (undated) DEVICE — 3M™ TEGADERM™ TRANSPARENT FILM DRESSING FRAME STYLE, 1626W, 4 IN X 4-3/4 IN (10 CM X 12 CM), 50/CT 4CT/CASE: Brand: 3M™ TEGADERM™

## (undated) DEVICE — TROCAR: Brand: KII FIOS FIRST ENTRY

## (undated) DEVICE — TROCAR: Brand: KII SLEEVE

## (undated) DEVICE — DRAIN SURG 19FR L025IN DIA63MM SIL CHN RND FULL FLUT CLS

## (undated) DEVICE — SYR 10ML LUER LOK 1/5ML GRAD --

## (undated) DEVICE — Device

## (undated) DEVICE — SUTURE MCRYL SZ 4-0 L27IN ABSRB UD L19MM PS-2 1/2 CIR PRIM Y426H

## (undated) DEVICE — SOL IRRIGATION INJ NACL 0.9% 500ML BTL

## (undated) DEVICE — TUBING INSUF 0.3UM FLTR W/ LUERLOCK CONN

## (undated) DEVICE — TRANSFER SET 3": Brand: MEDLINE INDUSTRIES, INC.

## (undated) DEVICE — GENERAL LAPAROSCOPY-MRMC: Brand: MEDLINE INDUSTRIES, INC.

## (undated) DEVICE — SUT SLK 2-0SH 30IN BLK --

## (undated) DEVICE — GLOVE ORANGE PI 7 1/2   MSG9075

## (undated) DEVICE — GLOVE ORTHO 7 1/2   MSG9475

## (undated) DEVICE — SHEAR HARMONIC ACET 5MMX36CM -- ACE PLUS

## (undated) DEVICE — SUT ETHLN 3-0 18IN PS1 BLK --

## (undated) DEVICE — HYPODERMIC SAFETY NEEDLE: Brand: MAGELLAN

## (undated) DEVICE — CANISTER, RIGID, 3000CC: Brand: MEDLINE INDUSTRIES, INC.

## (undated) DEVICE — TROCARS: Brand: KII® OPTICAL ACCESS SYSTEM

## (undated) DEVICE — TOTAL TRAY, 16FR 10ML SIL FOLEY, URN: Brand: MEDLINE

## (undated) DEVICE — VISUALIZATION SYSTEM: Brand: CLEARIFY